# Patient Record
Sex: FEMALE | Race: BLACK OR AFRICAN AMERICAN | Employment: FULL TIME | URBAN - METROPOLITAN AREA
[De-identification: names, ages, dates, MRNs, and addresses within clinical notes are randomized per-mention and may not be internally consistent; named-entity substitution may affect disease eponyms.]

---

## 2018-12-31 ENCOUNTER — HOSPITAL ENCOUNTER (EMERGENCY)
Age: 23
Discharge: HOME | End: 2018-12-31
Attending: EMERGENCY MEDICINE

## 2018-12-31 VITALS
TEMPERATURE: 98 F | WEIGHT: 201.31 LBS | OXYGEN SATURATION: 99 % | RESPIRATION RATE: 16 BRPM | HEART RATE: 80 BPM | DIASTOLIC BLOOD PRESSURE: 76 MMHG | SYSTOLIC BLOOD PRESSURE: 125 MMHG | BODY MASS INDEX: 34.56 KG/M2

## 2018-12-31 DIAGNOSIS — O23.41 URINARY TRACT INFECTION IN MOTHER DURING FIRST TRIMESTER OF PREGNANCY: Primary | ICD-10-CM

## 2018-12-31 LAB
A/G RATIO: 0.9 (ref 1.1–2.2)
ALBUMIN SERPL-MCNC: 3.8 G/DL (ref 3.4–5)
ALP BLD-CCNC: 58 U/L (ref 40–129)
ALT SERPL-CCNC: 22 U/L (ref 10–40)
ANION GAP SERPL CALCULATED.3IONS-SCNC: 9 MMOL/L (ref 3–16)
AST SERPL-CCNC: 14 U/L (ref 15–37)
BACTERIA: ABNORMAL /HPF
BASOPHILS ABSOLUTE: 0 K/UL (ref 0–0.2)
BASOPHILS RELATIVE PERCENT: 0.3 %
BILIRUB SERPL-MCNC: 0.4 MG/DL (ref 0–1)
BILIRUBIN URINE: NEGATIVE
BLOOD, URINE: NEGATIVE
BUN BLDV-MCNC: 5 MG/DL (ref 7–20)
CALCIUM SERPL-MCNC: 9.1 MG/DL (ref 8.3–10.6)
CHLORIDE BLD-SCNC: 104 MMOL/L (ref 99–110)
CLARITY: ABNORMAL
CO2: 23 MMOL/L (ref 21–32)
COLOR: YELLOW
CREAT SERPL-MCNC: <0.5 MG/DL (ref 0.6–1.1)
EOSINOPHILS ABSOLUTE: 0.1 K/UL (ref 0–0.6)
EOSINOPHILS RELATIVE PERCENT: 1.7 %
EPITHELIAL CELLS, UA: 13 /HPF (ref 0–5)
GFR AFRICAN AMERICAN: >60
GFR NON-AFRICAN AMERICAN: >60
GLOBULIN: 4.1 G/DL
GLUCOSE BLD-MCNC: 88 MG/DL (ref 70–99)
GLUCOSE URINE: NEGATIVE MG/DL
GONADOTROPIN, CHORIONIC (HCG) QUANT: NORMAL MIU/ML
HCT VFR BLD CALC: 37.8 % (ref 36–48)
HEMOGLOBIN: 12.8 G/DL (ref 12–16)
HYALINE CASTS: 9 /LPF (ref 0–8)
KETONES, URINE: NEGATIVE MG/DL
LEUKOCYTE ESTERASE, URINE: ABNORMAL
LYMPHOCYTES ABSOLUTE: 2.4 K/UL (ref 1–5.1)
LYMPHOCYTES RELATIVE PERCENT: 37.6 %
MCH RBC QN AUTO: 29.4 PG (ref 26–34)
MCHC RBC AUTO-ENTMCNC: 33.8 G/DL (ref 31–36)
MCV RBC AUTO: 86.8 FL (ref 80–100)
MICROSCOPIC EXAMINATION: YES
MONOCYTES ABSOLUTE: 0.5 K/UL (ref 0–1.3)
MONOCYTES RELATIVE PERCENT: 8.2 %
NEUTROPHILS ABSOLUTE: 3.3 K/UL (ref 1.7–7.7)
NEUTROPHILS RELATIVE PERCENT: 52.2 %
NITRITE, URINE: NEGATIVE
PDW BLD-RTO: 13.3 % (ref 12.4–15.4)
PH UA: 6
PLATELET # BLD: 345 K/UL (ref 135–450)
PMV BLD AUTO: 7.8 FL (ref 5–10.5)
POTASSIUM SERPL-SCNC: 4.2 MMOL/L (ref 3.5–5.1)
PROTEIN UA: NEGATIVE MG/DL
RBC # BLD: 4.35 M/UL (ref 4–5.2)
RBC UA: 2 /HPF (ref 0–4)
SODIUM BLD-SCNC: 136 MMOL/L (ref 136–145)
SPECIFIC GRAVITY UA: 1.02
TOTAL PROTEIN: 7.9 G/DL (ref 6.4–8.2)
URINE TYPE: ABNORMAL
UROBILINOGEN, URINE: 1 E.U./DL
WBC # BLD: 6.3 K/UL (ref 4–11)
WBC UA: 11 /HPF (ref 0–5)

## 2018-12-31 PROCEDURE — 81001 URINALYSIS AUTO W/SCOPE: CPT

## 2018-12-31 PROCEDURE — 85025 COMPLETE CBC W/AUTO DIFF WBC: CPT

## 2018-12-31 PROCEDURE — 36415 COLL VENOUS BLD VENIPUNCTURE: CPT

## 2018-12-31 PROCEDURE — 80053 COMPREHEN METABOLIC PANEL: CPT

## 2018-12-31 PROCEDURE — 99284 EMERGENCY DEPT VISIT MOD MDM: CPT

## 2018-12-31 PROCEDURE — 84702 CHORIONIC GONADOTROPIN TEST: CPT

## 2018-12-31 RX ORDER — NITROFURANTOIN 25; 75 MG/1; MG/1
100 CAPSULE ORAL 2 TIMES DAILY
Qty: 14 CAPSULE | Refills: 0 | Status: SHIPPED | OUTPATIENT
Start: 2018-12-31 | End: 2019-01-07

## 2018-12-31 ASSESSMENT — PAIN DESCRIPTION - LOCATION: LOCATION: ABDOMEN

## 2018-12-31 ASSESSMENT — PAIN DESCRIPTION - PAIN TYPE: TYPE: ACUTE PAIN

## 2018-12-31 ASSESSMENT — PAIN DESCRIPTION - DESCRIPTORS: DESCRIPTORS: CRAMPING

## 2018-12-31 ASSESSMENT — PAIN SCALES - GENERAL: PAINLEVEL_OUTOF10: 4

## 2019-02-26 ENCOUNTER — HOSPITAL ENCOUNTER (EMERGENCY)
Age: 24
Discharge: HOME | End: 2019-02-26
Payer: COMMERCIAL

## 2019-02-26 VITALS
DIASTOLIC BLOOD PRESSURE: 71 MMHG | OXYGEN SATURATION: 100 % | RESPIRATION RATE: 16 BRPM | WEIGHT: 208 LBS | SYSTOLIC BLOOD PRESSURE: 128 MMHG | TEMPERATURE: 98.1 F | HEIGHT: 64 IN | HEART RATE: 75 BPM | BODY MASS INDEX: 35.51 KG/M2

## 2019-02-26 DIAGNOSIS — O99.891 ASYMPTOMATIC BACTERIURIA DURING PREGNANCY: ICD-10-CM

## 2019-02-26 DIAGNOSIS — R42 POSITIONAL LIGHTHEADEDNESS: Primary | ICD-10-CM

## 2019-02-26 DIAGNOSIS — R82.71 ASYMPTOMATIC BACTERIURIA DURING PREGNANCY: ICD-10-CM

## 2019-02-26 LAB
A/G RATIO: 0.9 (ref 1.1–2.2)
ALBUMIN SERPL-MCNC: 3.7 G/DL (ref 3.4–5)
ALP BLD-CCNC: 84 U/L (ref 40–129)
ALT SERPL-CCNC: 22 U/L (ref 10–40)
ANION GAP SERPL CALCULATED.3IONS-SCNC: 12 MMOL/L (ref 3–16)
AST SERPL-CCNC: 15 U/L (ref 15–37)
BACTERIA: ABNORMAL /HPF
BASOPHILS ABSOLUTE: 0 K/UL (ref 0–0.2)
BASOPHILS RELATIVE PERCENT: 0.2 %
BILIRUB SERPL-MCNC: 0.4 MG/DL (ref 0–1)
BILIRUBIN URINE: NEGATIVE
BLOOD, URINE: NEGATIVE
BUN BLDV-MCNC: 5 MG/DL (ref 7–20)
CALCIUM SERPL-MCNC: 9.3 MG/DL (ref 8.3–10.6)
CHLORIDE BLD-SCNC: 98 MMOL/L (ref 99–110)
CLARITY: ABNORMAL
CO2: 23 MMOL/L (ref 21–32)
COLOR: YELLOW
CREAT SERPL-MCNC: <0.5 MG/DL (ref 0.6–1.1)
EOSINOPHILS ABSOLUTE: 0.2 K/UL (ref 0–0.6)
EOSINOPHILS RELATIVE PERCENT: 2.8 %
EPITHELIAL CELLS, UA: 20 /HPF (ref 0–5)
GFR AFRICAN AMERICAN: >60
GFR NON-AFRICAN AMERICAN: >60
GLOBULIN: 4.2 G/DL
GLUCOSE BLD-MCNC: 94 MG/DL (ref 70–99)
GLUCOSE URINE: NEGATIVE MG/DL
HCT VFR BLD CALC: 35.2 % (ref 36–48)
HEMOGLOBIN: 12 G/DL (ref 12–16)
KETONES, URINE: 15 MG/DL
LEUKOCYTE ESTERASE, URINE: NEGATIVE
LYMPHOCYTES ABSOLUTE: 1.8 K/UL (ref 1–5.1)
LYMPHOCYTES RELATIVE PERCENT: 25.8 %
MCH RBC QN AUTO: 30.2 PG (ref 26–34)
MCHC RBC AUTO-ENTMCNC: 34 G/DL (ref 31–36)
MCV RBC AUTO: 89 FL (ref 80–100)
MICROSCOPIC EXAMINATION: YES
MONOCYTES ABSOLUTE: 0.8 K/UL (ref 0–1.3)
MONOCYTES RELATIVE PERCENT: 10.7 %
NEUTROPHILS ABSOLUTE: 4.3 K/UL (ref 1.7–7.7)
NEUTROPHILS RELATIVE PERCENT: 60.5 %
NITRITE, URINE: NEGATIVE
PDW BLD-RTO: 13.8 % (ref 12.4–15.4)
PH UA: 6
PLATELET # BLD: 320 K/UL (ref 135–450)
PMV BLD AUTO: 8.3 FL (ref 5–10.5)
POTASSIUM SERPL-SCNC: 4.5 MMOL/L (ref 3.5–5.1)
PROTEIN UA: NEGATIVE MG/DL
RBC # BLD: 3.95 M/UL (ref 4–5.2)
RBC UA: 2 /HPF (ref 0–4)
SODIUM BLD-SCNC: 133 MMOL/L (ref 136–145)
SPECIFIC GRAVITY UA: 1.03
TOTAL PROTEIN: 7.9 G/DL (ref 6.4–8.2)
URINE REFLEX TO CULTURE: ABNORMAL
URINE TYPE: ABNORMAL
UROBILINOGEN, URINE: 1 E.U./DL
WBC # BLD: 7 K/UL (ref 4–11)
WBC UA: 4 /HPF (ref 0–5)

## 2019-02-26 PROCEDURE — 85025 COMPLETE CBC W/AUTO DIFF WBC: CPT

## 2019-02-26 PROCEDURE — 81001 URINALYSIS AUTO W/SCOPE: CPT

## 2019-02-26 PROCEDURE — 96360 HYDRATION IV INFUSION INIT: CPT

## 2019-02-26 PROCEDURE — 2580000003 HC RX 258: Performed by: PHYSICIAN ASSISTANT

## 2019-02-26 PROCEDURE — 80053 COMPREHEN METABOLIC PANEL: CPT

## 2019-02-26 PROCEDURE — 36415 COLL VENOUS BLD VENIPUNCTURE: CPT

## 2019-02-26 PROCEDURE — 99284 EMERGENCY DEPT VISIT MOD MDM: CPT

## 2019-02-26 RX ORDER — 0.9 % SODIUM CHLORIDE 0.9 %
1000 INTRAVENOUS SOLUTION INTRAVENOUS ONCE
Status: COMPLETED | OUTPATIENT
Start: 2019-02-26 | End: 2019-02-26

## 2019-02-26 RX ORDER — NITROFURANTOIN 25; 75 MG/1; MG/1
100 CAPSULE ORAL 2 TIMES DAILY
Qty: 14 CAPSULE | Refills: 0 | Status: SHIPPED | OUTPATIENT
Start: 2019-02-26 | End: 2019-03-05

## 2019-02-26 RX ADMIN — SODIUM CHLORIDE 1000 ML: 9 INJECTION, SOLUTION INTRAVENOUS at 19:17

## 2019-02-26 ASSESSMENT — ENCOUNTER SYMPTOMS
SHORTNESS OF BREATH: 0
NAUSEA: 0
BACK PAIN: 0
ABDOMINAL PAIN: 0
VOMITING: 0
DIARRHEA: 0
CONSTIPATION: 0
COLOR CHANGE: 0

## 2019-05-27 ENCOUNTER — HOSPITAL ENCOUNTER (OUTPATIENT)
Age: 24
Discharge: HOME | End: 2019-05-27
Attending: OBSTETRICS & GYNECOLOGY | Admitting: OBSTETRICS & GYNECOLOGY
Payer: COMMERCIAL

## 2019-05-27 ENCOUNTER — HOSPITAL ENCOUNTER (EMERGENCY)
Age: 24
Discharge: HOME | End: 2019-05-27
Attending: EMERGENCY MEDICINE
Payer: COMMERCIAL

## 2019-05-27 ENCOUNTER — APPOINTMENT (OUTPATIENT)
Dept: GENERAL RADIOLOGY | Age: 24
End: 2019-05-27
Payer: COMMERCIAL

## 2019-05-27 VITALS
SYSTOLIC BLOOD PRESSURE: 134 MMHG | TEMPERATURE: 97.5 F | RESPIRATION RATE: 18 BRPM | HEART RATE: 93 BPM | DIASTOLIC BLOOD PRESSURE: 83 MMHG

## 2019-05-27 VITALS
BODY MASS INDEX: 36.88 KG/M2 | HEIGHT: 64 IN | WEIGHT: 216 LBS | HEART RATE: 100 BPM | SYSTOLIC BLOOD PRESSURE: 124 MMHG | RESPIRATION RATE: 16 BRPM | OXYGEN SATURATION: 100 % | TEMPERATURE: 98.5 F | DIASTOLIC BLOOD PRESSURE: 88 MMHG

## 2019-05-27 DIAGNOSIS — Z3A.31 31 WEEKS GESTATION OF PREGNANCY: ICD-10-CM

## 2019-05-27 DIAGNOSIS — R55 NEAR SYNCOPE: Primary | ICD-10-CM

## 2019-05-27 PROBLEM — Z36.89 NON-STRESS TEST REACTIVE ON FETAL SURVEILLANCE: Status: ACTIVE | Noted: 2019-05-27

## 2019-05-27 LAB
A/G RATIO: 1 (ref 1.1–2.2)
ALBUMIN SERPL-MCNC: 3.4 G/DL (ref 3.4–5)
ALP BLD-CCNC: 75 U/L (ref 40–129)
ALT SERPL-CCNC: 14 U/L (ref 10–40)
ANION GAP SERPL CALCULATED.3IONS-SCNC: 11 MMOL/L (ref 3–16)
AST SERPL-CCNC: 16 U/L (ref 15–37)
BASOPHILS ABSOLUTE: 0 K/UL (ref 0–0.2)
BASOPHILS RELATIVE PERCENT: 0 %
BILIRUB SERPL-MCNC: 0.3 MG/DL (ref 0–1)
BILIRUBIN URINE: NEGATIVE
BLOOD, URINE: NEGATIVE
BUN BLDV-MCNC: 7 MG/DL (ref 7–20)
CALCIUM SERPL-MCNC: 9.5 MG/DL (ref 8.3–10.6)
CHLORIDE BLD-SCNC: 103 MMOL/L (ref 99–110)
CLARITY: CLEAR
CO2: 22 MMOL/L (ref 21–32)
COLOR: YELLOW
CREAT SERPL-MCNC: <0.5 MG/DL (ref 0.6–1.1)
EOSINOPHILS ABSOLUTE: 0.1 K/UL (ref 0–0.6)
EOSINOPHILS RELATIVE PERCENT: 1 %
GFR AFRICAN AMERICAN: >60
GFR NON-AFRICAN AMERICAN: >60
GLOBULIN: 3.5 G/DL
GLUCOSE BLD-MCNC: 91 MG/DL (ref 70–99)
GLUCOSE URINE: NEGATIVE MG/DL
HCT VFR BLD CALC: 30.7 % (ref 36–48)
HEMATOLOGY PATH CONSULT: YES
HEMOGLOBIN: 10.5 G/DL (ref 12–16)
KETONES, URINE: NEGATIVE MG/DL
LEUKOCYTE ESTERASE, URINE: NEGATIVE
LYMPHOCYTES ABSOLUTE: 2.5 K/UL (ref 1–5.1)
LYMPHOCYTES RELATIVE PERCENT: 30 %
MCH RBC QN AUTO: 30.5 PG (ref 26–34)
MCHC RBC AUTO-ENTMCNC: 34.3 G/DL (ref 31–36)
MCV RBC AUTO: 89 FL (ref 80–100)
MICROSCOPIC EXAMINATION: NORMAL
MONOCYTES ABSOLUTE: 0.7 K/UL (ref 0–1.3)
MONOCYTES RELATIVE PERCENT: 9 %
MONONUCLEAR UNIDENTIFIED CELLS: 1 %
NEUTROPHILS ABSOLUTE: 4.9 K/UL (ref 1.7–7.7)
NEUTROPHILS RELATIVE PERCENT: 59 %
NITRITE, URINE: NEGATIVE
PDW BLD-RTO: 13.3 % (ref 12.4–15.4)
PH UA: 6 (ref 5–8)
PLATELET # BLD: 264 K/UL (ref 135–450)
PLATELET SLIDE REVIEW: ADEQUATE
PMV BLD AUTO: 7.7 FL (ref 5–10.5)
POTASSIUM SERPL-SCNC: 3.9 MMOL/L (ref 3.5–5.1)
PROTEIN UA: NEGATIVE MG/DL
RBC # BLD: 3.45 M/UL (ref 4–5.2)
RBC # BLD: NORMAL 10*6/UL
SLIDE REVIEW: ABNORMAL
SODIUM BLD-SCNC: 136 MMOL/L (ref 136–145)
SPECIFIC GRAVITY UA: 1.02 (ref 1–1.03)
TOTAL PROTEIN: 6.9 G/DL (ref 6.4–8.2)
TROPONIN: <0.01 NG/ML
URINE REFLEX TO CULTURE: NORMAL
URINE TYPE: NORMAL
UROBILINOGEN, URINE: 0.2 E.U./DL
WBC # BLD: 8.3 K/UL (ref 4–11)

## 2019-05-27 PROCEDURE — 99284 EMERGENCY DEPT VISIT MOD MDM: CPT

## 2019-05-27 PROCEDURE — 84484 ASSAY OF TROPONIN QUANT: CPT

## 2019-05-27 PROCEDURE — 80053 COMPREHEN METABOLIC PANEL: CPT

## 2019-05-27 PROCEDURE — 81003 URINALYSIS AUTO W/O SCOPE: CPT

## 2019-05-27 PROCEDURE — 85025 COMPLETE CBC W/AUTO DIFF WBC: CPT

## 2019-05-27 PROCEDURE — 36415 COLL VENOUS BLD VENIPUNCTURE: CPT

## 2019-05-27 PROCEDURE — 2580000003 HC RX 258: Performed by: PHYSICIAN ASSISTANT

## 2019-05-27 PROCEDURE — 93005 ELECTROCARDIOGRAM TRACING: CPT

## 2019-05-27 PROCEDURE — 99211 OFF/OP EST MAY X REQ PHY/QHP: CPT

## 2019-05-27 RX ORDER — 0.9 % SODIUM CHLORIDE 0.9 %
1000 INTRAVENOUS SOLUTION INTRAVENOUS ONCE
Status: COMPLETED | OUTPATIENT
Start: 2019-05-27 | End: 2019-05-27

## 2019-05-27 RX ORDER — VALACYCLOVIR HYDROCHLORIDE 500 MG/1
500 TABLET, FILM COATED ORAL 2 TIMES DAILY
COMMUNITY
End: 2020-12-17

## 2019-05-27 RX ADMIN — SODIUM CHLORIDE 1000 ML: 9 INJECTION, SOLUTION INTRAVENOUS at 19:21

## 2019-05-27 ASSESSMENT — ENCOUNTER SYMPTOMS
DIARRHEA: 0
VOMITING: 0
COUGH: 0
WHEEZING: 0
SHORTNESS OF BREATH: 1
RHINORRHEA: 0
ABDOMINAL PAIN: 0
NAUSEA: 0

## 2019-05-27 ASSESSMENT — PAIN DESCRIPTION - LOCATION: LOCATION: HEAD

## 2019-05-27 NOTE — ED NOTES
Bed: 11  Expected date:   Expected time:   Means of arrival: [facility]  Comments:  [address]       Joyce Adame RN  05/27/19 7728

## 2019-05-27 NOTE — ED PROVIDER NOTES
[facility]        Pt Name: Christoph Plata  MRN: [id number]  [place] 1995  Date of evaluation: 2019  Provider: Misa Felder PA-C  PCP: Judy Heard MD    This patient was seen and evaluated by the attending physician Dr. Genoveva Mansfield. CHIEF COMPLAINT       Chief Complaint   Patient presents with    Loss of Consciousness     Pt states she was working in a hot area where she has \"passed out before\" and passed out today. Denies any vaginal bleeding. Now reports c/o headache. Pt is 31wks pregnant.  4 para 0. HISTORY OF PRESENT ILLNESS   (Location/Symptom, Timing/Onset, Context/Setting, Quality, Duration, Modifying Factors, Severity)  Note limiting factors. Christoph Plata is a 21 y.o. female , currently approximately 31 weeks pregnant, G4, P0, Ab3 presents for evaluation of syncope. 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 She states that she felt very dizzy and went to sit down in the next thing she knows her bosses over her and trying to wake her up. She states that she was unresponsive for approximately 2-3 minutes. She states that she has passed out at work before and was told that she is dehydrated. She states that she has tried to drink a lot today. She denies any chest pain or shortness of breath at this time. She feels dizzy/lightheaded. No weakness, visual disturbances. No abdominal pain, nausea or vomiting. No vaginal bleeding or discharge. No urinary complaints. She denies falling and hitting her head. No other complaints or concerns at this time. Nursing Notes were all reviewed and agreed with or any disagreements were addressed  in the HPI.     REVIEW OF SYSTEMS    (2-9 systems for level 4, 10 or more for level 5)     Review of Systems   Constitutional: Negative for Neurological: She is alert and oriented to person, place, and time. She is not disoriented. No cranial nerve deficit. GCS eye subscore is 4. GCS verbal subscore is 5. GCS motor subscore is 6. Skin: Skin is warm and dry. She is not diaphoretic. Psychiatric: She has a normal mood and affect. Her behavior is normal.   Nursing note and vitals reviewed. DIAGNOSTIC RESULTS   LABS:    Labs Reviewed   CBC WITH AUTO DIFFERENTIAL - Abnormal; Notable for the following components:       Result Value    RBC 3.45 (*)     Hemoglobin 10.5 (*)     Hematocrit 30.7 (*)     Unid. Mononu 1 (*)     All other components within normal limits    Narrative:     Performed at:  [facility] [address], [place]   Phone [phone]   COMPREHENSIVE METABOLIC PANEL - Abnormal; Notable for the following components:    CREATININE <0.5 (*)     Albumin/Globulin Ratio 1.0 (*)     All other components within normal limits    Narrative:     Performed at:  [facility] [address], [place] [facility] [place]   Phone [phone]   TROPONIN    Narrative:     Performed at:  [facility] [address], [place] [facility] [place]   Phone [phone]   URINE RT REFLEX TO CULTURE       All other labs were within normal range or not returned as of this dictation. EKG: All EKG's are interpreted by the Emergency Department Physician who either signs orCo-signs this chart in the absence of a cardiologist.  Please see their note for interpretation of EKG. RADIOLOGY:   Non-plain film images such as CT, Ultrasound and MRI are read by the radiologist. Plain radiographic images are visualized andpreliminarily interpreted by the  ED Provider with the below findings:        Interpretation perthe Radiologist below, if available at the time of this note:    No orders to display     No results found.        PROCEDURES   Unless otherwise noted below, none     Procedures    CRITICAL CARE TIME   N/A    CONSULTS:  None      EMERGENCY DEPARTMENT COURSE and DIFFERENTIALDIAGNOSIS/MDM:   Vitals:    Vitals:    05/27/19 1821 05/27/19 1925 05/27/19 2125   BP: 122/83 128/82 124/88   Pulse: 103 104 100   Resp: 18 16 16   Temp: 98.5 °F (36.9 °C)     SpO2: 99% 99% 100%   Weight: 216 lb (98 kg)     Height: 5' 4\" (1.626 m)         Patient was given thefollowing medications:  Medications   0.9 % sodium chloride bolus (0 mLs Intravenous Stopped 5/27/19 2015)       Patient presents for evaluation after syncopal episode. On exam, she is well-appearing and in no acute distress. On exam, she is slightly tachycardic but vitals otherwise stable and she is afebrile. She is alert and oriented ×3. GCS 15. Cranial nerves II through XII are intact. Lungs are clear to auscultation bilaterally, chest is nontender and abdomen is benign. Patient was given fluid resuscitation for symptomatic relief and will be reevaluated. Fetal heart tones were detected by nursing staff. Please see attending note for EKG interpretation. CBC, CMP are unremarkable aside from mild chronic anemia with hemoglobin of 10.5. Troponin is negative. The patient was tolerating p.o., liter of fluids in the ED and remained asymptomatic. I spoke with the on-call Laurita Neves, Dr. Yesenia Loo, who is recommending patient be sent upstairs for nonstress test. She was encouraged to then follow up either PCP or ObGyn as scheduled or as needed. Patient has a normal repeat neurological exam. At this time there is no indication of head injury, encephalopathy, multiple sclerosis, TIA/CVA, seizures, dehydration, hypoglycemia, mass lesions, metabolic cause, cardiac arrhythmia, PE, GI bleeding or orthostatic hypotension. Patient is stable throughout ED course and safe for outpatient follow-up. Patient made aware of treatment plan and verbally understood and agreed.  Patient stable for outpatient follow-up with their family doctor in 24-48 hours. FINAL IMPRESSION      1.  Near syncope          DISPOSITION/PLAN   DISPOSITION        PATIENT REFERREDTO:  Zuly Robles MD  [address] [place] [address]  [phone]    Call   For a re-check in 2-3   days    [facility] Emergency Department  [address] [facility]  [phone]  Go to   If symptoms worsen      DISCHARGE MEDICATIONS:  New Prescriptions    No medications on file       DISCONTINUED MEDICATIONS:  Discontinued Medications    No medications on file              (Please note that portions ofthis note were completed with a voice recognition program.  Efforts were made to edit the dictations but occasionally words are mis-transcribed.)    Ashia Reece PA-C (electronically signed)           Jaja Causey PA-C  05/27/19 0384

## 2019-05-27 NOTE — ED NOTES
Saline lock inserted with blood drawn and sent to lab. Pt resting at present. Denies any pain, dizziness, or headaches. Stated that is was just hot at her job and she passed out. Pt 31 weeks pregnant. No abdominal cramping noted. Respirations even and unlabored. NS infusing IV. Call bell in reach.      Erendira Jeter RN  05/27/19 1925

## 2019-05-28 NOTE — ED NOTES
Pt's NS was not infusing -100 ml infused. Saline lock catheter was kinked inside the vein. Pt did not want stuck again and stated that she was feeling better. [facility] stated to let pt drink 'big gulp' - pt drinking the water and tolerating po.         Nisreen Ryan RN  05/27/19 2035

## 2019-05-28 NOTE — ED PROVIDER NOTES
[facility] Emergency Department      Pt Name: Isabel Pittman  MRN: [id number]  [place] 1995  Date of evaluation: 2019  Provider: Artis Voss MD  I independently performed a history and physical on Isabel Pittman. All diagnostic, treatment, and disposition decisions were made by myself in conjunction with the advanced practice provider. HPI: Isabel Pittman presented with   Chief Complaint   Patient presents with    Loss of Consciousness     Pt states she was working in a hot area where she has \"passed out before\" and passed out today. Denies any vaginal bleeding. Now reports c/o headache. Pt is 31wks pregnant.  4 para 0. Isabel Pittman has a past medical history of Asthma and Herpes. She has a past surgical history that includes knee surgery (Right). No current facility-administered medications on file prior to encounter. Current Outpatient Medications on File Prior to Encounter   Medication Sig Dispense Refill    valACYclovir (VALTREX) 500 MG tablet Take 500 mg by mouth 2 times daily      Prenatal Vit-DSS-Fe Cbn-FA (PRENATAL AD) TABS Take 1 tablet by mouth daily 30 tablet 0    Prenatal Vit-Fe Fumarate-FA (PRENATABS FA) TABS Take 1 tablet by mouth daily 30 tablet 0    medroxyPROGESTERone (DEPO-PROVERA) 150 MG/ML injection Inject 150 mg into the muscle once.  albuterol (PROVENTIL HFA;VENTOLIN HFA) 108 (90 BASE) MCG/ACT inhaler Inhale 2 puffs into the lungs every 6 hours as needed.          PHYSICAL EXAM  Vitals: /83   Pulse 103   Temp 98.5 °F (36.9 °C)   Resp 18   Ht 5' 4\" (1.626 m)   Wt 216 lb (98 kg)   LMP 2018   SpO2 99%   BMI 37.08 kg/m²   Constitutional:  21 y.o. female alert  HENT:  Atraumatic, oral mucosa moist  Neck:  No visible JVD, supple  Chest/Lungs:  Respiratory effort normal, clear, regular  Abdomen:  Non-distended, soft, gravid, NT, FHT 140s  Back:  No gross deformity  Extremities:  Normal tone and perfusion, no edema or tenderness  Neurologic:  Alert, speech normal, mentation normal, pupils equal, normal coordination of extremities, no facial asymmetry, gait is normal    Medical Decision Making and Plan: Briefly, this is an 21 y. o.female who presented with near syncopal episode while at work, hot work environment. She is 31 weeks pregnant, reports good fetal movement. She says that she felt lightheaded and was able to sit down before she actually passed out and there was no injury whatsoever. She believes that she overheated. She is taking oral hydration in the ED. Clinical exam is reassuring. We doubt that the cause was a primary cardiac event such as an arrhythmia or obstructive process. We also doubt an acute neurologic event such as ischemic stroke, hemorrhage or seizure. She feels better now and we feel it is safe to discharge. Dr. Maicol Frost consulted and she would like to have the patient evaluated in L&D so she was referred upstairs prior to release. LorenaChurdan Mert was given appropriate discharge instructions. Referral to follow up provider. For further details of [address] [place] [address] Emergency Department encounter, please see documentation by advanced practice provider SEVERO Price. Labs Reviewed   CBC WITH AUTO DIFFERENTIAL - Abnormal; Notable for the following components:       Result Value    RBC 3.45 (*)     Hemoglobin 10.5 (*)     Hematocrit 30.7 (*)     Unid. Mononu 1 (*)     All other components within normal limits    Narrative:     Performed at:  [facility] [address], [place], [facility] [address]   Phone [phone]   COMPREHENSIVE METABOLIC PANEL - Abnormal; Notable for the following components:    CREATININE <0.5 (*)     Albumin/Globulin Ratio 1.0 (*)     All other components within normal limits    Narrative:     Performed at:  [facility]  [address] [facility], [address]   Phone [phone]   URINE RT REFLEX TO CULTURE

## 2019-05-28 NOTE — PROCEDURES
FETAL SURVEILLANCE TESTING SUMMARY    INDICATIONS:  patient reassurance- s/p near syncopal episode    OBJECTIVE RESULTS:  Fetal heart variability: moderate    Fetal surveillance: reassuring

## 2019-05-28 NOTE — ED NOTES
Pt drank 'big gulp' without difficulty and 'feeling better'. Pt did not want chest xray done. Family at bedside. OB consulted.      Zach Berg RN  05/27/19 0323

## 2019-05-28 NOTE — ED NOTES
Spoke with RN at [facility] triage - pt to have baby monitored. Will DC from ED. Pt ambulated to the restroom - no dizziness noted.      Lauri Trujillo RN  05/27/19 9934

## 2019-06-08 LAB
EKG ATRIAL RATE: 105 BPM
EKG DIAGNOSIS: NORMAL
EKG P AXIS: 48 DEGREES
EKG P-R INTERVAL: 136 MS
EKG Q-T INTERVAL: 336 MS
EKG QRS DURATION: 62 MS
EKG QTC CALCULATION (BAZETT): 444 MS
EKG R AXIS: 13 DEGREES
EKG T AXIS: 15 DEGREES
EKG VENTRICULAR RATE: 105 BPM

## 2020-12-02 LAB
ABO/RH: NORMAL
ANION GAP SERPL CALCULATED.3IONS-SCNC: 11 MMOL/L (ref 3–16)
BASOPHILS ABSOLUTE: 0.1 K/UL (ref 0–0.2)
BASOPHILS RELATIVE PERCENT: 0.6 %
BILIRUBIN URINE: NEGATIVE
BLOOD, URINE: NEGATIVE
BUN BLDV-MCNC: 10 MG/DL (ref 7–20)
CALCIUM SERPL-MCNC: 9 MG/DL (ref 8.3–10.6)
CHLORIDE BLD-SCNC: 104 MMOL/L (ref 99–110)
CLARITY: ABNORMAL
CO2: 18 MMOL/L (ref 21–32)
COLOR: YELLOW
CREAT SERPL-MCNC: <0.5 MG/DL (ref 0.6–1.1)
EOSINOPHILS ABSOLUTE: 0.1 K/UL (ref 0–0.6)
EOSINOPHILS RELATIVE PERCENT: 0.9 %
EPITHELIAL CELLS, UA: 1 /HPF (ref 0–5)
GFR AFRICAN AMERICAN: >60
GFR NON-AFRICAN AMERICAN: >60
GLUCOSE BLD-MCNC: 85 MG/DL (ref 70–99)
GLUCOSE URINE: NEGATIVE MG/DL
GONADOTROPIN, CHORIONIC (HCG) QUANT: 105.8 MIU/ML
HCT VFR BLD CALC: 36.2 % (ref 36–48)
HEMOGLOBIN: 11.8 G/DL (ref 12–16)
HYALINE CASTS: 1 /LPF (ref 0–8)
KETONES, URINE: NEGATIVE MG/DL
LEUKOCYTE ESTERASE, URINE: NEGATIVE
LYMPHOCYTES ABSOLUTE: 3.5 K/UL (ref 1–5.1)
LYMPHOCYTES RELATIVE PERCENT: 38.5 %
MCH RBC QN AUTO: 27.9 PG (ref 26–34)
MCHC RBC AUTO-ENTMCNC: 32.6 G/DL (ref 31–36)
MCV RBC AUTO: 85.4 FL (ref 80–100)
MICROSCOPIC EXAMINATION: YES
MONOCYTES ABSOLUTE: 0.6 K/UL (ref 0–1.3)
MONOCYTES RELATIVE PERCENT: 6.6 %
NEUTROPHILS ABSOLUTE: 4.9 K/UL (ref 1.7–7.7)
NEUTROPHILS RELATIVE PERCENT: 53.4 %
NITRITE, URINE: NEGATIVE
PDW BLD-RTO: 14 % (ref 12.4–15.4)
PH UA: 5.5 (ref 5–8)
PLATELET # BLD: 398 K/UL (ref 135–450)
PMV BLD AUTO: 7.6 FL (ref 5–10.5)
POTASSIUM SERPL-SCNC: 3.6 MMOL/L (ref 3.5–5.1)
PROTEIN UA: NEGATIVE MG/DL
RBC # BLD: 4.24 M/UL (ref 4–5.2)
RBC UA: 0 /HPF (ref 0–4)
SODIUM BLD-SCNC: 133 MMOL/L (ref 136–145)
SPECIFIC GRAVITY UA: 1.01 (ref 1–1.03)
URINE REFLEX TO CULTURE: ABNORMAL
URINE TYPE: ABNORMAL
UROBILINOGEN, URINE: 0.2 E.U./DL
WBC # BLD: 9.1 K/UL (ref 4–11)
WBC UA: 0 /HPF (ref 0–5)

## 2020-12-02 PROCEDURE — 86900 BLOOD TYPING SEROLOGIC ABO: CPT

## 2020-12-02 PROCEDURE — 85025 COMPLETE CBC W/AUTO DIFF WBC: CPT

## 2020-12-02 PROCEDURE — 84702 CHORIONIC GONADOTROPIN TEST: CPT

## 2020-12-02 PROCEDURE — 99283 EMERGENCY DEPT VISIT LOW MDM: CPT

## 2020-12-02 PROCEDURE — 81001 URINALYSIS AUTO W/SCOPE: CPT

## 2020-12-02 PROCEDURE — 80048 BASIC METABOLIC PNL TOTAL CA: CPT

## 2020-12-02 PROCEDURE — 86901 BLOOD TYPING SEROLOGIC RH(D): CPT

## 2020-12-02 ASSESSMENT — PAIN SCALES - GENERAL: PAINLEVEL_OUTOF10: 8

## 2020-12-02 ASSESSMENT — PAIN DESCRIPTION - DESCRIPTORS: DESCRIPTORS: CRAMPING

## 2020-12-02 ASSESSMENT — PAIN DESCRIPTION - LOCATION: LOCATION: ABDOMEN

## 2020-12-02 ASSESSMENT — PAIN DESCRIPTION - PAIN TYPE: TYPE: ACUTE PAIN

## 2020-12-03 ENCOUNTER — HOSPITAL ENCOUNTER (EMERGENCY)
Age: 25
Discharge: HOME | End: 2020-12-03
Attending: EMERGENCY MEDICINE
Payer: COMMERCIAL

## 2020-12-03 ENCOUNTER — APPOINTMENT (OUTPATIENT)
Dept: ULTRASOUND IMAGING | Age: 25
End: 2020-12-03
Payer: COMMERCIAL

## 2020-12-03 VITALS
BODY MASS INDEX: 36.88 KG/M2 | TEMPERATURE: 98 F | OXYGEN SATURATION: 99 % | WEIGHT: 216 LBS | DIASTOLIC BLOOD PRESSURE: 93 MMHG | HEART RATE: 90 BPM | RESPIRATION RATE: 18 BRPM | HEIGHT: 64 IN | SYSTOLIC BLOOD PRESSURE: 137 MMHG

## 2020-12-03 PROCEDURE — 76817 TRANSVAGINAL US OBSTETRIC: CPT

## 2020-12-03 ASSESSMENT — ENCOUNTER SYMPTOMS
DIARRHEA: 0
NAUSEA: 0
ABDOMINAL PAIN: 1
VOMITING: 0
SHORTNESS OF BREATH: 0

## 2020-12-03 NOTE — LETTER
[facility] Emergency Department  [address] [place] [address], [address]             December 3, 2020    Patient: Radha Figueroa   YOB: 1995   Date of Visit: 12/2/2020       To Whom It May Concern:    Chula Pace was seen and treated in our emergency department on 12/2/2020. She may return to work on Monday 12/7/2020.       Sincerely,         [facility] Emergency Dept

## 2020-12-03 NOTE — ED PROVIDER NOTES
intrauterine pregnancy. 3. Trace pelvic free fluid surrounding right adnexa. Findings were discussed with physician assistant, Carolyn Pierre at 1:42 am   on 12/3/2020. CLINICAL IMPRESSION  1. Less than 8 weeks gestation of pregnancy    2. Abnormal ultrasound of uterus        Blood pressure (!) 138/95, pulse 81, temperature 98 °F (36.7 °C), temperature source Infrared, resp. rate 16, height 5' 4\" (1.626 m), weight 216 lb (98 kg), SpO2 98 %, unknown if currently breastfeeding. Ava Sawyer was discharged to home in stable condition. This chart was generated in part by using Dragon Dictation system and may contain errors related to that system including errors in grammar, punctuation, and spelling, as well as words and phrases that may be inappropriate. If there are any questions or concerns please feel free to contact the dictating provider for clarification.      Madiha Ibarra DO  ATTENDING, [address] [place] [address],   12/08/20 0715

## 2020-12-03 NOTE — ED NOTES
Pt presents with abdominal discomfort. Pt had U/S done- pt stated she was positive pregnant after doing a home test. Respirations even and unlabored. Call bell in reach.       Mitch Kirkpatrick RN  12/03/20 5816

## 2020-12-03 NOTE — ED PROVIDER NOTES
[facility]        Pt Name: Namrata Terrell  MRN: [id number]  [place] 1995  Date of evaluation: 12/2/2020  Provider: Phyllis Thakkar PA-C  PCP: Rhonda Guerrero MD     I have seen and evaluated this patient with my supervising physician [address] [place] [id number]       Chief Complaint   Patient presents with    Abdominal Pain     pt states she has been having some abd cramping x2 days. took preg test yesterday and was +. HISTORY OF PRESENT ILLNESS   (Location, Timing/Onset, Context/Setting, Quality, Duration, Modifying Factors, Severity, Associated Signs and Symptoms)  Note limiting factors. Namrata Terrell is a 22 y.o. female patient presents emergency department for evaluation of abdominal cramping for the past 2 days. Patient states she has had generalized fatigue and dry mouth. Patient states she has been staying well-hydrated. Patient states she has lower pelvic pain that is bilateral.  It is not worse 1 side or the other. Patient states she took a pregnancy test yesterday and it was positive. Patient's last menstrual period was on November 3. She denies any nausea or vomiting. Denies any fever. Denies feeling lightheaded or dizzy. Patient states she has had [id number]. She denies any bleeding or discharge. Nursing Notes were all reviewed and agreed with or any disagreements were addressed in the HPI. REVIEW OF SYSTEMS    (2-9 systems for level 4, 10 or more for level 5)     Review of Systems   Constitutional: Negative for fatigue and fever. HENT: Negative. Eyes: Negative for visual disturbance. Respiratory: Negative for shortness of breath. Cardiovascular: Negative for chest pain. Gastrointestinal: Positive for abdominal pain. Negative for diarrhea, nausea and vomiting. Genitourinary: Negative. Negative for dysuria, vaginal bleeding and vaginal discharge. Musculoskeletal: Negative.     Skin: Negative. Neurological: Negative. Positives and Pertinent negatives as per HPI. Except as noted above in the ROS, all other systems were reviewed and negative. PAST MEDICAL HISTORY     Past Medical History:   Diagnosis Date    Asthma     Herpes     genital type 2         SURGICAL HISTORY     Past Surgical History:   Procedure Laterality Date    KNEE SURGERY Right          CURRENTMEDICATIONS       Discharge Medication List as of 12/3/2020  2:49 AM      CONTINUE these medications which have NOT CHANGED    Details   valACYclovir (VALTREX) 500 MG tablet Take 500 mg by mouth 2 times dailyHistorical Med      Prenatal Vit-DSS-Fe Cbn-FA (PRENATAL AD) TABS Take 1 tablet by mouth daily, Disp-30 tablet, R-0Print      albuterol (PROVENTIL HFA;VENTOLIN HFA) 108 (90 BASE) MCG/ACT inhaler Inhale 2 puffs into the lungs every 6 hours as needed. Prenatal Vit-Fe Fumarate-FA (PRENATABS FA) TABS Take 1 tablet by mouth daily, Disp-30 tablet, R-0      medroxyPROGESTERone (DEPO-PROVERA) 150 MG/ML injection Inject 150 mg into the muscle once. ALLERGIES     Shellfish-derived products    FAMILYHISTORY       Family History   Problem Relation Age of Onset    High Cholesterol Mother     Diabetes Maternal Grandmother           SOCIAL HISTORY       Social History     Tobacco Use    Smoking status: Never Smoker    Smokeless tobacco: Never Used   Substance Use Topics    Alcohol use: No    Drug use: No       SCREENINGS             PHYSICAL EXAM    (up to 7 for level 4, 8 or more for level 5)     ED Triage Vitals [12/02/20 2121]   BP Temp Temp Source Pulse Resp SpO2 Height Weight   (!) 138/95 98 °F (36.7 °C) Infrared 81 16 98 % 5' 4\" (1.626 m) 216 lb (98 kg)       Physical Exam  Vitals signs and nursing note reviewed. Constitutional:       General: She is not in acute distress. Appearance: She is well-developed. She is not ill-appearing, toxic-appearing or diaphoretic.    HENT:      Head: Normocephalic and atraumatic. Nose: Nose normal.   Eyes:      General:         Right eye: No discharge. Left eye: No discharge. Neck:      Musculoskeletal: Normal range of motion and neck supple. Cardiovascular:      Rate and Rhythm: Normal rate and regular rhythm. Heart sounds: Normal heart sounds. No murmur. No gallop. Pulmonary:      Effort: Pulmonary effort is normal. No respiratory distress. Breath sounds: Normal breath sounds. No wheezing, rhonchi or rales. Abdominal:      General: Abdomen is flat. Bowel sounds are normal.      Palpations: Abdomen is soft. Tenderness: There is abdominal tenderness in the right lower quadrant and left lower quadrant. There is no guarding or rebound. Negative signs include Dumont's sign and McBurney's sign. Musculoskeletal: Normal range of motion. Skin:     General: Skin is warm and dry. Capillary Refill: Capillary refill takes less than 2 seconds. Coloration: Skin is not mottled or pale. Neurological:      General: No focal deficit present. Mental Status: She is alert and oriented to person, place, and time.    Psychiatric:         Mood and Affect: Mood normal.         Behavior: Behavior normal.         DIAGNOSTIC RESULTS   LABS:    Labs Reviewed   BASIC METABOLIC PANEL - Abnormal; Notable for the following components:       Result Value    Sodium 133 (*)     CO2 18 (*)     CREATININE <0.5 (*)     All other components within normal limits    Narrative:     Performed at:  [facility] [address], [place], [address] [facility]   Phone [phone]   CBC WITH AUTO DIFFERENTIAL - Abnormal; Notable for the following components:    Hemoglobin 11.8 (*)     All other components within normal limits    Narrative:     Performed at:  [facility] [address], [place], [facility]   Phone [phone]   URINE RT REFLEX TO CULTURE - Abnormal; Notable for the following components:    Clarity, UA CLOUDY (*)     All other components within normal limits    Narrative:     Performed at:  [facility] [address] [facility],  [place], [facility] [address]   Phone [phone]   HCG, QUANTITATIVE, PREGNANCY    Narrative:     Performed at:  [facility] [address] [facility],  [place], [address]   Phone [phone]   MICROSCOPIC URINALYSIS    Narrative:     Performed at:  [facility] [address] [place],  [place], [address]   Phone [phone]   ABO/RH    Narrative:     Performed at:  [facility] [address], [place],  [place], [facility] [address]   Phone [phone]       All other labs were within normal range or not returned as of this dictation. EKG: All EKG's are interpreted by the Emergency Department Physician in the absence of a cardiologist.  Please see their note for interpretation of EKG. RADIOLOGY:   Non-plain film images such as CT, Ultrasound and MRI are read by the radiologist. Plain radiographic images are visualized and preliminarily interpreted by the ED Provider with the below findings:        Interpretation per the Radiologist below, if available at the time of this note:    US OB TRANSVAGINAL   Final Result   1. Right ovarian tubal ring with echogenic properties and increased flow on   color Doppler evaluation concerning for presence of ectopic pregnancy. No   sonographic visualization of fetal pole or yolk sac. 2. No sonographic evidence of intrauterine pregnancy. 3. Trace pelvic free fluid surrounding right adnexa. Findings were discussed with physician assistant, Steve Ruvalcaba at 1:42 am   on 12/3/2020. Us Ob Transvaginal    Result Date: 12/3/2020  EXAMINATION: FIRST TRIMESTER OBSTETRIC ULTRASOUND 12/3/2020 TECHNIQUE: Transvaginal first trimester obstetric pelvic ultrasound was performed with color Doppler flow evaluation.  COMPARISON: None HISTORY: ORDERING SYSTEM PROVIDED HISTORY: Pregnant and Pelvic Pain TECHNOLOGIST PROVIDED HISTORY: Reason for exam:->Pregnant and Pelvic Pain FINDINGS: Uterus: 8.4 cm and is anteverted in position. Gestational Sac(s):  No sonographic visualization of gestational sac. Endometrial stripe measures 1.7 mm Yolk Sac:  Not visualized Fetal Pole:  Not visualized Crown Rump Length:  Not measured Fetal Heart Rate:  Not sonographically detected Right ovary: 3.0 x 2.6 x 2.4 cm. Right adnexal tubal ring is seen is noted within the right ovary which measures up to 1.2 cm in diameter and is hyperechoic relative to surrounding ovarian parenchyma with increased blood flow on color Doppler evaluation. Left ovary: 2.1 x 2.0 x 1.9 cm Free fluid: Trace pelvic free fluid is seen surrounding the right adnexa. 1. Right ovarian tubal ring with echogenic properties and increased flow on color Doppler evaluation concerning for presence of ectopic pregnancy. No sonographic visualization of fetal pole or yolk sac. 2. No sonographic evidence of intrauterine pregnancy. 3. Trace pelvic free fluid surrounding right adnexa. Findings were discussed with physician assistantMarshal at 1:42 am on 12/3/2020. PROCEDURES   Unless otherwise noted below, none     Procedures    CRITICAL CARE TIME   N/A    CONSULTS:  None      EMERGENCY DEPARTMENT COURSE and DIFFERENTIAL DIAGNOSIS/MDM:   Vitals:    Vitals:    12/02/20 2121 12/03/20 0248   BP: (!) 138/95 (!) 137/93   Pulse: 81 90   Resp: 16 18   Temp: 98 °F (36.7 °C)    TempSrc: Infrared    SpO2: 98% 99%   Weight: 216 lb (98 kg)    Height: 5' 4\" (1.626 m)        Patient was given the following medications:  Medications - No data to display      Patient presents emergency department for evaluation of abdominal pain in the setting of known pregnancy. Patient had a positive home pregnancy test yesterday. She states she just missed her period. Patient's beta-hCG is 105.8.   Patient states she is G7 AB 5 P1. She denies any bleeding. Abdomen is slightly tender to palpation to lower pelvis bilaterally. UA show no evidence of cystitis. Ultrasound shows right ovarian tubal ring with echogenic properties and increased flow concerning for ectopic pregnancy. There is no additional sonographic evidence of intrauterine pregnancy. Patient's OB/GYN practice was consulted at [address] [place] [address]. I spoke with Dr. Nathanael Garcia who states that the patient can be sent home and will be scheduled for follow-up blood work and examination ASA. He states his office will call the patient today. I did encourage the patient to call the office if she had not received a phone call by noon to schedule her follow-up evaluation. It was stressed to the patient that it was too early to determine exactly what will happen with this pregnancy. Patient has no history of any ectopic pregnancies with her multiple previous miscarriages. Patient is not in any significant discomfort. Patient is only very minimally tender on examination but the tenderness is equal bilaterally. There is no rebound or guarding. Patient appears well. Patient was given strict return precautions to return to the emergency department for any change in her symptoms. Patient is amenable to this close follow-up outpatient plan and will be discharged to speak with her OB/GYN's later today. At time of reevaluation patient does not have any significant abdominal pain and does not have any acute worsening of her symptoms. She still does not have any bleeding. I see nothing that would suggest an acute abdomen at this time. Based on history, physical exam, risk factors, and tests my suspicion for bowel obstruction, incarcerated hernia, acute pancreatitis, intra-abdominal abscess, perforated viscus, diverticulitis, cholecystitis, appendicitis, PID, ovarian torsion and tubo-ovarian abscess is very low.  There is no evidence of peritonitis, sepsis or toxicity at this time. I feel the patient can be managed as an outpatient with follow-up with her family doctor in 24-48 hours. Instructions have been given for the patient to return to the ED for worsening of the pain, high fevers, intractable vomiting, or bleeding. There was a high probability of life-threatening clinical deterioration in the patient's condition requiring my urgent intervention. The total critical care time spent while evaluating and treating this patient was at least 36 minutes. This excludes time spent doing separately billable procedures. This includes time at the bedside, data interpretation, medication management, obtaining critical history from collateral sources if the patient is unable to provide it directly, and physician consultation. Specifics of interventions taken and potentially life-threatening diagnostic considerations are listed in the medical decision making. FINAL IMPRESSION      1. Less than 8 weeks gestation of pregnancy    2.  Abnormal ultrasound of uterus          DISPOSITION/PLAN   DISPOSITION        PATIENT REFERREDTO:  Grace Rinne, MD  [facility]  [phone]    Schedule an appointment as soon as possible for a visit today  for re-evaluation      DISCHARGE MEDICATIONS:  Discharge Medication List as of 12/3/2020  2:49 AM          DISCONTINUED MEDICATIONS:  Discharge Medication List as of 12/3/2020  2:49 AM                 (Please note that portions of this note were completed with a voice recognition program.  Efforts were made to edit the dictations but occasionally words are mis-transcribed.)    Dominga Kawasaki, PA-C (electronically signed)            Dominga Kawasaki, PA-C  12/03/20 5789

## 2020-12-06 ENCOUNTER — HOSPITAL ENCOUNTER (EMERGENCY)
Age: 25
Discharge: HOME | End: 2020-12-06
Attending: STUDENT IN AN ORGANIZED HEALTH CARE EDUCATION/TRAINING PROGRAM
Payer: COMMERCIAL

## 2020-12-06 VITALS
HEART RATE: 100 BPM | DIASTOLIC BLOOD PRESSURE: 79 MMHG | WEIGHT: 216 LBS | TEMPERATURE: 98.3 F | BODY MASS INDEX: 35.99 KG/M2 | OXYGEN SATURATION: 97 % | SYSTOLIC BLOOD PRESSURE: 138 MMHG | HEIGHT: 65 IN | RESPIRATION RATE: 16 BRPM

## 2020-12-06 LAB
A/G RATIO: 1 (ref 1.1–2.2)
ALBUMIN SERPL-MCNC: 4 G/DL (ref 3.4–5)
ALP BLD-CCNC: 61 U/L (ref 40–129)
ALT SERPL-CCNC: 14 U/L (ref 10–40)
ANION GAP SERPL CALCULATED.3IONS-SCNC: 8 MMOL/L (ref 3–16)
AST SERPL-CCNC: 13 U/L (ref 15–37)
BASOPHILS ABSOLUTE: 0 K/UL (ref 0–0.2)
BASOPHILS RELATIVE PERCENT: 0.3 %
BILIRUB SERPL-MCNC: <0.2 MG/DL (ref 0–1)
BILIRUBIN URINE: NEGATIVE
BLOOD, URINE: NEGATIVE
BUN BLDV-MCNC: 7 MG/DL (ref 7–20)
CALCIUM SERPL-MCNC: 8.9 MG/DL (ref 8.3–10.6)
CHLORIDE BLD-SCNC: 103 MMOL/L (ref 99–110)
CLARITY: CLEAR
CO2: 25 MMOL/L (ref 21–32)
COLOR: YELLOW
CREAT SERPL-MCNC: <0.5 MG/DL (ref 0.6–1.1)
EOSINOPHILS ABSOLUTE: 0.1 K/UL (ref 0–0.6)
EOSINOPHILS RELATIVE PERCENT: 1.2 %
EPITHELIAL CELLS, UA: 4 /HPF (ref 0–5)
GFR AFRICAN AMERICAN: >60
GFR NON-AFRICAN AMERICAN: >60
GLOBULIN: 4.1 G/DL
GLUCOSE BLD-MCNC: 95 MG/DL (ref 70–99)
GLUCOSE URINE: NEGATIVE MG/DL
GONADOTROPIN, CHORIONIC (HCG) QUANT: 794.2 MIU/ML
HCT VFR BLD CALC: 34.3 % (ref 36–48)
HEMOGLOBIN: 11.4 G/DL (ref 12–16)
HYALINE CASTS: 0 /LPF (ref 0–8)
KETONES, URINE: NEGATIVE MG/DL
LEUKOCYTE ESTERASE, URINE: ABNORMAL
LYMPHOCYTES ABSOLUTE: 3 K/UL (ref 1–5.1)
LYMPHOCYTES RELATIVE PERCENT: 40.4 %
MCH RBC QN AUTO: 27.8 PG (ref 26–34)
MCHC RBC AUTO-ENTMCNC: 33.1 G/DL (ref 31–36)
MCV RBC AUTO: 83.9 FL (ref 80–100)
MICROSCOPIC EXAMINATION: YES
MONOCYTES ABSOLUTE: 0.5 K/UL (ref 0–1.3)
MONOCYTES RELATIVE PERCENT: 7 %
NEUTROPHILS ABSOLUTE: 3.8 K/UL (ref 1.7–7.7)
NEUTROPHILS RELATIVE PERCENT: 51.1 %
NITRITE, URINE: NEGATIVE
PDW BLD-RTO: 13.8 % (ref 12.4–15.4)
PH UA: 6 (ref 5–8)
PLATELET # BLD: 385 K/UL (ref 135–450)
PMV BLD AUTO: 7.6 FL (ref 5–10.5)
POTASSIUM SERPL-SCNC: 4.1 MMOL/L (ref 3.5–5.1)
PROTEIN UA: NEGATIVE MG/DL
RBC # BLD: 4.09 M/UL (ref 4–5.2)
RBC UA: 1 /HPF (ref 0–4)
SODIUM BLD-SCNC: 136 MMOL/L (ref 136–145)
SPECIFIC GRAVITY UA: 1.02 (ref 1–1.03)
TOTAL PROTEIN: 8.1 G/DL (ref 6.4–8.2)
URINE REFLEX TO CULTURE: ABNORMAL
URINE TYPE: ABNORMAL
UROBILINOGEN, URINE: 0.2 E.U./DL
WBC # BLD: 7.5 K/UL (ref 4–11)
WBC UA: 2 /HPF (ref 0–5)

## 2020-12-06 PROCEDURE — 80053 COMPREHEN METABOLIC PANEL: CPT

## 2020-12-06 PROCEDURE — 85025 COMPLETE CBC W/AUTO DIFF WBC: CPT

## 2020-12-06 PROCEDURE — 99283 EMERGENCY DEPT VISIT LOW MDM: CPT

## 2020-12-06 PROCEDURE — 81001 URINALYSIS AUTO W/SCOPE: CPT

## 2020-12-06 PROCEDURE — 84702 CHORIONIC GONADOTROPIN TEST: CPT

## 2020-12-06 PROCEDURE — 6370000000 HC RX 637 (ALT 250 FOR IP): Performed by: PHYSICIAN ASSISTANT

## 2020-12-06 RX ORDER — ACETAMINOPHEN 500 MG
500 TABLET ORAL ONCE
Status: COMPLETED | OUTPATIENT
Start: 2020-12-06 | End: 2020-12-06

## 2020-12-06 RX ADMIN — ACETAMINOPHEN 500 MG: 500 TABLET, FILM COATED ORAL at 18:36

## 2020-12-06 ASSESSMENT — PAIN DESCRIPTION - ORIENTATION: ORIENTATION: RIGHT

## 2020-12-06 ASSESSMENT — ENCOUNTER SYMPTOMS
NAUSEA: 0
DIARRHEA: 0
ABDOMINAL DISTENTION: 0
SHORTNESS OF BREATH: 0
ABDOMINAL PAIN: 1
WHEEZING: 0
VOMITING: 0
COLOR CHANGE: 0
STRIDOR: 0
BACK PAIN: 1
CONSTIPATION: 0
COUGH: 0

## 2020-12-06 ASSESSMENT — PAIN DESCRIPTION - PAIN TYPE
TYPE: ACUTE PAIN
TYPE: ACUTE PAIN

## 2020-12-06 ASSESSMENT — PAIN SCALES - GENERAL
PAINLEVEL_OUTOF10: 7

## 2020-12-06 ASSESSMENT — PAIN DESCRIPTION - LOCATION: LOCATION: BACK;HIP;ABDOMEN

## 2020-12-06 NOTE — ED PROVIDER NOTES
I independently performed a history and physical on Kasia Aguero. All diagnostic, treatment, and disposition decisions were made by myself in conjunction with the advanced practice provider. Briefly, this is a 22 y.o. female here for rule out ectopic pregnancy. Patient is endorsing sciatica pain in the lower back that radiates down her right leg. She is also endorsing some cramping to the right lower abdomen. Patient found out that she was pregnant on Wednesday. She did have an OB appointment afterward with her hCG levels increasing however an IUP was not located on ultrasound. She denies any vaginal bleeding, chest pain or trouble breathing. No loss of fluids. This is her sixth or seventh pregnancy she states. She does have a healthy child at home as well as a history of chemical abortions.     On exam pt is resting comfortably  Cardiac RRR, no murmur  Lungs clear bilaterally, no increased work of breathing  Abdomen soft nontender uterus nonpalpable  No calf edema or tenderness  Neuro no drift in extremities       Labs Reviewed   URINE RT REFLEX TO CULTURE - Abnormal; Notable for the following components:       Result Value    Leukocyte Esterase, Urine SMALL (*)     All other components within normal limits    Narrative:     Performed at:  [facility] [address], [place], [address] [place] [address]   Phone [phone]   CBC WITH AUTO DIFFERENTIAL - Abnormal; Notable for the following components:    Hemoglobin 11.4 (*)     Hematocrit 34.3 (*)     All other components within normal limits    Narrative:     Performed at:  [facility]  [address] [facility] [address]   Phone [phone]   COMPREHENSIVE METABOLIC PANEL - Abnormal; Notable for the following components:    CREATININE <0.5 (*)     Albumin/Globulin Ratio 1.0 (*)     AST 13 (*)     All other components within normal limits    Narrative:     Performed at:  [facility]) [facility]  [address] [facility], [place]   Phone [phone]   HCG, QUANTITATIVE, PREGNANCY    Narrative:     Performed at:  [facility]  [address],  [place], [place]   Phone [phone]   MICROSCOPIC URINALYSIS    Narrative:     Performed at:  [facility]  [address] [facility], [place]   Phone [phone]     Medications   acetaminophen (TYLENOL) tablet 500 mg (500 mg Oral Given 12/6/20 1836)     No orders to display       Course and MDM:  Patient is 42-year-old female with recent positive pregnancy test presenting to the emergency room for suspected ectopic. On arrival she is comfortable and hemodynamically stable. She has no focal abdominal tenderness or peritoneal signs. I have low suspicion for acute ovarian torsion or appendicitis at this time. Her hCGs are up trending from 4 days ago. Her current level is less than 800 and unlikely that we will be able to detect a gestational sac on ultrasound today. This case was discussed with Dr. Iglesia Chau with OB/GYN. They recommend repeating ultrasound as an outpatient in 1 week. Patient given these recommendations and is agreeable to plan. She understand that she must return to the emergency room if any worsening abdominal pain, bleeding or syncope or lightheadedness as these could be signs of a ruptured ectopic pregnancy. She is agreeable to this plan. Patient Referrals:  Goyo Harvey MD  [facility]    In 3 days      [facility] Emergency Department  [address] [place] [address]  [phone]    If symptoms worsen    follow up with your OB in 1-3 days for continued trending HCG levels and a repeat ultrasound in one week            Discharge Medications:  Discharge Medication List as of 12/6/2020  7:15 PM          FINAL IMPRESSION  1.  Abdominal pain during pregnancy in first trimester    2. Sciatica of right side        Blood pressure 138/79, pulse 100, temperature 98.3 °F (36.8 °C), temperature source Oral, resp. rate 16, height 5' 4.5\" (1.638 m), weight 216 lb (98 kg), last menstrual period 11/03/2020, SpO2 97 %, unknown if currently breastfeeding.      For further details of [address] [facility] [address] emergency department encounter, please see documentation by advanced practice provider       Candie Espana MD  12/06/20 3556

## 2020-12-06 NOTE — ED PROVIDER NOTES
5)     Review of Systems   Constitutional: Negative for chills and fever. HENT: Negative. Eyes: Negative for visual disturbance. Respiratory: Negative for cough, shortness of breath, wheezing and stridor. Cardiovascular: Negative for chest pain, palpitations and leg swelling. Gastrointestinal: Positive for abdominal pain. Negative for abdominal distention, constipation, diarrhea, nausea and vomiting. Endocrine: Negative. Genitourinary: Negative for difficulty urinating, dysuria, flank pain, frequency, hematuria, urgency, vaginal bleeding, vaginal discharge and vaginal pain. Musculoskeletal: Positive for back pain. Negative for neck pain and neck stiffness. Skin: Negative for color change, pallor, rash and wound. Neurological: Negative for dizziness, tremors, seizures, syncope, facial asymmetry, speech difficulty, weakness, light-headedness, numbness and headaches. Psychiatric/Behavioral: Negative for confusion. All other systems reviewed and are negative. Positives and Pertinent negatives as per HPI. Except as noted above in the ROS, all other systems were reviewed and negative. PAST MEDICAL HISTORY     Past Medical History:   Diagnosis Date    Asthma     Herpes     genital type 2         SURGICAL HISTORY     Past Surgical History:   Procedure Laterality Date    KNEE SURGERY Right          CURRENTMEDICATIONS       Previous Medications    ALBUTEROL (PROVENTIL HFA;VENTOLIN HFA) 108 (90 BASE) MCG/ACT INHALER    Inhale 2 puffs into the lungs every 6 hours as needed. MEDROXYPROGESTERONE (DEPO-PROVERA) 150 MG/ML INJECTION    Inject 150 mg into the muscle once.       PRENATAL VIT-DSS-FE CBN-FA (PRENATAL AD) TABS    Take 1 tablet by mouth daily    PRENATAL VIT-FE FUMARATE-FA (PRENATABS FA) TABS    Take 1 tablet by mouth daily    VALACYCLOVIR (VALTREX) 500 MG TABLET    Take 500 mg by mouth 2 times daily         ALLERGIES     Shellfish-derived products    FAMILYHISTORY Cervical back: Normal.      Thoracic back: Normal.      Lumbar back: She exhibits tenderness (right sided musculature). She exhibits normal range of motion, no bony tenderness, no swelling, no edema, no deformity and no laceration. Comments: Right SI joint tenderness with Positive SLR in RLE  No foot drop or saddle paresthesia  Able to heel and toe walk  No midline vertebral tenderness or step off deformity  No extremity edema, posterior calf or thigh tenderness, palpable cord, discoloration. Negative homans. Skin:     General: Skin is warm and dry. Capillary Refill: Capillary refill takes less than 2 seconds. Coloration: Skin is not jaundiced or pale. Findings: No bruising, erythema, lesion or rash. Neurological:      General: No focal deficit present. Mental Status: She is alert and oriented to person, place, and time. Sensory: No sensory deficit. Motor: No weakness.       Deep Tendon Reflexes: Reflexes normal.   Psychiatric:         Mood and Affect: Mood normal.         Behavior: Behavior normal.         DIAGNOSTIC RESULTS   LABS:    Labs Reviewed   URINE RT REFLEX TO CULTURE - Abnormal; Notable for the following components:       Result Value    Leukocyte Esterase, Urine SMALL (*)     All other components within normal limits    Narrative:     Performed at:  [facility] [address], [place], [address]   Phone [phone]   CBC WITH AUTO DIFFERENTIAL - Abnormal; Notable for the following components:    Hemoglobin 11.4 (*)     Hematocrit 34.3 (*)     All other components within normal limits    Narrative:     Performed at:  [facility] [address], [place], [facility] [address]   Phone [phone]   COMPREHENSIVE METABOLIC PANEL - Abnormal; Notable for the following components:    CREATININE <0.5 (*)     Albumin/Globulin Ratio 1.0 (*)     AST 13 (*)     All other components within normal limits Narrative:     Performed at:  [facility]  [address] [facility]  [place], [address]   Phone [phone]   HCG, QUANTITATIVE, PREGNANCY    Narrative:     Performed at:  [facility]  [address] [facility]  [place], [address]   Phone [phone]   MICROSCOPIC URINALYSIS    Narrative:     Performed at:  [facility]  [address] [facility]  [place], [address]   Phone [phone]       All other labs were within normal range or not returned as of this dictation. EKG: All EKG's are interpreted by the Emergency Department Physician in the absence of a cardiologist.  Please see their note for interpretation of EKG. RADIOLOGY:   Non-plain film images such as CT, Ultrasound and MRI are read by the radiologist. Plain radiographic images are visualized and preliminarily interpreted by the ED Provider with the below findings:        Interpretation per the Radiologist below, if available at the time of this note:    No orders to display     Us Ob Transvaginal    Result Date: 12/3/2020  EXAMINATION: FIRST TRIMESTER OBSTETRIC ULTRASOUND 12/3/2020 TECHNIQUE: Transvaginal first trimester obstetric pelvic ultrasound was performed with color Doppler flow evaluation. COMPARISON: None HISTORY: ORDERING SYSTEM PROVIDED HISTORY: Pregnant and Pelvic Pain TECHNOLOGIST PROVIDED HISTORY: Reason for exam:->Pregnant and Pelvic Pain FINDINGS: Uterus: 8.4 cm and is anteverted in position. Gestational Sac(s):  No sonographic visualization of gestational sac. Endometrial stripe measures 1.7 mm Yolk Sac:  Not visualized Fetal Pole:  Not visualized Crown Rump Length:  Not measured Fetal Heart Rate:  Not sonographically detected Right ovary: 3.0 x 2.6 x 2.4 cm.   Right adnexal tubal ring is seen is noted within the right ovary which measures up to 1.2 cm in diameter and is hyperechoic relative to surrounding ovarian parenchyma with increased blood flow on color Doppler evaluation. Left ovary: 2.1 x 2.0 x 1.9 cm Free fluid: Trace pelvic free fluid is seen surrounding the right adnexa. 1. Right ovarian tubal ring with echogenic properties and increased flow on color Doppler evaluation concerning for presence of ectopic pregnancy. No sonographic visualization of fetal pole or yolk sac. 2. No sonographic evidence of intrauterine pregnancy. 3. Trace pelvic free fluid surrounding right adnexa. Findings were discussed with physician assistant, Stanley Carrel at 1:42 am on 12/3/2020. PROCEDURES   Unless otherwise noted below, none     Procedures    CRITICAL CARE TIME   N/A    CONSULTS:  IP CONSULT TO OB GYN  **I spoke with Dr Alejo Tejeda, on call for Dr Carolina Herrera (her OB/GN) at [phone]. We discussed the patient's case in length and recent ultrasound and labwork/presentation. She says that this patient can follow up in their office. She can have continued hcg trends and does not recommend another ultrasound until one week has passed, as she is still early in pregnancy and not likely to see any significant change. She says that she has low suspicion for ectopic pregnancy because of the positive upward trending HCG levels. **    EMERGENCY DEPARTMENT COURSE and DIFFERENTIAL DIAGNOSIS/MDM:   Vitals:    Vitals:    12/06/20 1649 12/06/20 1730 12/06/20 1800 12/06/20 1834   BP: (!) 151/93 129/79 (!) 141/89    Pulse: 102   98   Resp: 16      Temp: 98.3 °F (36.8 °C)      TempSrc: Oral      SpO2: 100% 95% 97%    Weight: 216 lb (98 kg)      Height: 5' 4.5\" (1.638 m)          Patient was given the following medications:  Medications   acetaminophen (TYLENOL) tablet 500 mg (has no administration in time range)           This patient presents to the emergency department complaining of abdominal pain and pregnancy.   She has an upward trending hCG value and after discussion with the on-call OB/GYN, the specialist states that this is a positive upward trend and has low suspicion for ectopic pregnancy. She does not recommend another ultrasound but rather wants the patient to follow-up in their office this week. She recommends an ultrasound in 1 week. Patient understands and agrees with plan. Patient also reports acute on chronic sciatica symptoms. My suspicion is low for cauda equina, central cord syndrome, preeclampsia, help syndrome, acute fracture or dislocation, epidural abscess or hematoma, discitis, meningitis, encephalitis, transverse myelitis, shingles, intussusception,acute surgical abdomen, obstruction, perforation, abscess, mesenteric ischemia, AAA, dissection, cholecystitis, cholangitis, pancreatitis, appendicitis, C. diff colitis, diverticulitis, volvulus, incarcerated hernia, necrotizing fasciitis, TOA, ovarian torsion, PID, ectopic pregnancy, karissa amanda Joey syndrome,  incarcerated hernia, Mira gangrene, pyelonephritis, perinephric abscess, kidney stone, urosepsis, fistula or other concerning pathology. We have addressed her concerns and expectations. She was given strict return precautions. FINAL IMPRESSION      1. Abdominal pain during pregnancy in first trimester    2.  Sciatica of right side          DISPOSITION/PLAN   DISPOSITION Decision To Discharge 12/06/2020 06:34:44 PM      PATIENT REFERREDTO:  Saul Connell MD  [place] [address]  [phone]    In 3 days      [facility] Emergency Department  [address] [place] [address]  [phone]    If symptoms worsen    follow up with your OB in 1-3 days for continued trending HCG levels and a repeat ultrasound in one week            DISCHARGE MEDICATIONS:  New Prescriptions    No medications on file       DISCONTINUED MEDICATIONS:  Discontinued Medications    No medications on file              (Please note that portions of this note were completed with a voice recognition program.  Efforts were made to edit the dictations but occasionally words are mis-transcribed.)    Zulma Rubinstein, PA-C (electronically signed)           Zulma Rubinstein, PA-C  12/06/20 2029

## 2020-12-06 NOTE — ED NOTES
Bed: 21  Expected date:   Expected time:   Means of arrival: Walk In  Comments:     [facility]  12/06/20 1048

## 2020-12-17 ENCOUNTER — APPOINTMENT (OUTPATIENT)
Dept: ULTRASOUND IMAGING | Age: 25
End: 2020-12-17
Payer: COMMERCIAL

## 2020-12-17 ENCOUNTER — HOSPITAL ENCOUNTER (EMERGENCY)
Age: 25
Discharge: HOME | End: 2020-12-17
Attending: FAMILY MEDICINE
Payer: COMMERCIAL

## 2020-12-17 VITALS
HEART RATE: 98 BPM | BODY MASS INDEX: 37.18 KG/M2 | SYSTOLIC BLOOD PRESSURE: 144 MMHG | WEIGHT: 220 LBS | DIASTOLIC BLOOD PRESSURE: 83 MMHG | OXYGEN SATURATION: 98 % | TEMPERATURE: 97.8 F | RESPIRATION RATE: 16 BRPM

## 2020-12-17 LAB
A/G RATIO: 1 (ref 1.1–2.2)
ABO/RH: NORMAL
ALBUMIN SERPL-MCNC: 3.8 G/DL (ref 3.4–5)
ALP BLD-CCNC: 76 U/L (ref 40–129)
ALT SERPL-CCNC: 28 U/L (ref 10–40)
ANION GAP SERPL CALCULATED.3IONS-SCNC: 11 MMOL/L (ref 3–16)
ANTIBODY SCREEN: NORMAL
ANTIBODY SCREEN: NORMAL
AST SERPL-CCNC: 21 U/L (ref 15–37)
BASOPHILS ABSOLUTE: 0 K/UL (ref 0–0.2)
BASOPHILS RELATIVE PERCENT: 0.5 %
BILIRUB SERPL-MCNC: 0.4 MG/DL (ref 0–1)
BILIRUBIN URINE: NEGATIVE
BLOOD, URINE: NEGATIVE
BUN BLDV-MCNC: 11 MG/DL (ref 7–20)
CALCIUM SERPL-MCNC: 9.4 MG/DL (ref 8.3–10.6)
CHLORIDE BLD-SCNC: 101 MMOL/L (ref 99–110)
CLARITY: CLEAR
CO2: 22 MMOL/L (ref 21–32)
COLOR: YELLOW
CREAT SERPL-MCNC: <0.5 MG/DL (ref 0.6–1.1)
EOSINOPHILS ABSOLUTE: 0.1 K/UL (ref 0–0.6)
EOSINOPHILS RELATIVE PERCENT: 1.2 %
GFR AFRICAN AMERICAN: >60
GFR NON-AFRICAN AMERICAN: >60
GLOBULIN: 4 G/DL
GLUCOSE BLD-MCNC: 105 MG/DL (ref 70–99)
GLUCOSE URINE: NEGATIVE MG/DL
GONADOTROPIN, CHORIONIC (HCG) QUANT: NORMAL MIU/ML
HCT VFR BLD CALC: 34.9 % (ref 36–48)
HEMOGLOBIN: 11.6 G/DL (ref 12–16)
KETONES, URINE: NEGATIVE MG/DL
LEUKOCYTE ESTERASE, URINE: NEGATIVE
LYMPHOCYTES ABSOLUTE: 3.1 K/UL (ref 1–5.1)
LYMPHOCYTES RELATIVE PERCENT: 34.2 %
MCH RBC QN AUTO: 27.5 PG (ref 26–34)
MCHC RBC AUTO-ENTMCNC: 33.3 G/DL (ref 31–36)
MCV RBC AUTO: 82.8 FL (ref 80–100)
MICROSCOPIC EXAMINATION: NORMAL
MONOCYTES ABSOLUTE: 0.7 K/UL (ref 0–1.3)
MONOCYTES RELATIVE PERCENT: 7.6 %
NEUTROPHILS ABSOLUTE: 5.1 K/UL (ref 1.7–7.7)
NEUTROPHILS RELATIVE PERCENT: 56.5 %
NITRITE, URINE: NEGATIVE
PDW BLD-RTO: 13.5 % (ref 12.4–15.4)
PH UA: 5 (ref 5–8)
PLATELET # BLD: 391 K/UL (ref 135–450)
PMV BLD AUTO: 7.6 FL (ref 5–10.5)
POTASSIUM SERPL-SCNC: 4.2 MMOL/L (ref 3.5–5.1)
PROTEIN UA: NEGATIVE MG/DL
RBC # BLD: 4.22 M/UL (ref 4–5.2)
SODIUM BLD-SCNC: 134 MMOL/L (ref 136–145)
SPECIFIC GRAVITY UA: 1.02 (ref 1–1.03)
TOTAL PROTEIN: 7.8 G/DL (ref 6.4–8.2)
URINE REFLEX TO CULTURE: NORMAL
URINE TYPE: NORMAL
UROBILINOGEN, URINE: 0.2 E.U./DL
WBC # BLD: 9 K/UL (ref 4–11)

## 2020-12-17 PROCEDURE — 86901 BLOOD TYPING SEROLOGIC RH(D): CPT

## 2020-12-17 PROCEDURE — 81003 URINALYSIS AUTO W/O SCOPE: CPT

## 2020-12-17 PROCEDURE — 80053 COMPREHEN METABOLIC PANEL: CPT

## 2020-12-17 PROCEDURE — 86850 RBC ANTIBODY SCREEN: CPT

## 2020-12-17 PROCEDURE — 86900 BLOOD TYPING SEROLOGIC ABO: CPT

## 2020-12-17 PROCEDURE — 2580000003 HC RX 258: Performed by: FAMILY MEDICINE

## 2020-12-17 PROCEDURE — 99282 EMERGENCY DEPT VISIT SF MDM: CPT

## 2020-12-17 PROCEDURE — 76817 TRANSVAGINAL US OBSTETRIC: CPT

## 2020-12-17 PROCEDURE — 84702 CHORIONIC GONADOTROPIN TEST: CPT

## 2020-12-17 PROCEDURE — 6360000002 HC RX W HCPCS: Performed by: FAMILY MEDICINE

## 2020-12-17 PROCEDURE — 96374 THER/PROPH/DIAG INJ IV PUSH: CPT

## 2020-12-17 PROCEDURE — 85025 COMPLETE CBC W/AUTO DIFF WBC: CPT

## 2020-12-17 RX ORDER — ONDANSETRON 4 MG/1
4 TABLET, ORALLY DISINTEGRATING ORAL EVERY 8 HOURS PRN
Qty: 20 TABLET | Refills: 0 | Status: SHIPPED | OUTPATIENT
Start: 2020-12-17 | End: 2020-12-24

## 2020-12-17 RX ORDER — DOXYLAMINE SUCCINATE AND PYRIDOXINE HYDROCHLORIDE, DELAYED RELEASE TABLETS 10 MG/10 MG 10; 10 MG/1; MG/1
TABLET, DELAYED RELEASE ORAL
Qty: 60 TABLET | Refills: 0 | Status: SHIPPED | OUTPATIENT
Start: 2020-12-17 | End: 2020-12-30

## 2020-12-17 RX ORDER — 0.9 % SODIUM CHLORIDE 0.9 %
1000 INTRAVENOUS SOLUTION INTRAVENOUS ONCE
Status: COMPLETED | OUTPATIENT
Start: 2020-12-17 | End: 2020-12-17

## 2020-12-17 RX ORDER — ONDANSETRON 2 MG/ML
4 INJECTION INTRAMUSCULAR; INTRAVENOUS ONCE
Status: COMPLETED | OUTPATIENT
Start: 2020-12-17 | End: 2020-12-17

## 2020-12-17 RX ADMIN — ONDANSETRON 4 MG: 2 INJECTION INTRAMUSCULAR; INTRAVENOUS at 04:11

## 2020-12-17 RX ADMIN — SODIUM CHLORIDE 1000 ML: 9 INJECTION, SOLUTION INTRAVENOUS at 04:12

## 2020-12-17 ASSESSMENT — PAIN SCALES - GENERAL: PAINLEVEL_OUTOF10: 6

## 2020-12-17 NOTE — ED PROVIDER NOTES
Food insecurity     Worry: Not on file     Inability: Not on file    Transportation needs     Medical: Not on file     Non-medical: Not on file   Tobacco Use    Smoking status: Never Smoker    Smokeless tobacco: Never Used   Substance and Sexual Activity    Alcohol use: No    Drug use: No    Sexual activity: Yes     Partners: Male   Lifestyle    Physical activity     Days per week: Not on file     Minutes per session: Not on file    Stress: Not on file   Relationships    Social connections     Talks on phone: Not on file     Gets together: Not on file     Attends [employer] service: Not on file     Active member of club or organization: Not on file     Attends meetings of clubs or organizations: Not on file     Relationship status: Not on file    Intimate partner violence     Fear of current or ex partner: Not on file     Emotionally abused: Not on file     Physically abused: Not on file     Forced sexual activity: Not on file   Other Topics Concern    Not on file   Social History Narrative    Not on file     No current facility-administered medications for this encounter. Current Outpatient Medications   Medication Sig Dispense Refill    ondansetron (ZOFRAN-ODT) 4 MG disintegrating tablet Place 1 tablet under the tongue every 8 hours as needed for Nausea or Vomiting May Sub regular tablet (non-ODT) if insurance does not cover ODT. 20 tablet 0    doxylamine-pyridoxine 10-10 MG TBEC One po QHS to help prevent nausea and vomiting 60 tablet 0    Prenatal Vit-DSS-Fe Cbn-FA (PRENATAL AD) TABS Take 1 tablet by mouth daily 30 tablet 0    medroxyPROGESTERone (DEPO-PROVERA) 150 MG/ML injection Inject 150 mg into the muscle once.         Prenatal Vit-Fe Fumarate-FA (PRENATABS FA) TABS Take 1 tablet by mouth daily 30 tablet 0     Allergies   Allergen Reactions    Shellfish-Derived Products Swelling       Nursing Notes Reviewed    Physical Exam:  ED Triage Vitals [12/17/20 0110]   Enc Vitals Group      BP (!) 144/83      Pulse 98      Resp 16      Temp 97.8 °F (36.6 °C)      Temp Source Infrared      SpO2 98 %      Weight 220 lb (99.8 kg)      Height       Head Circumference       Peak Flow       Pain Score       Pain Loc       Pain Edu? Excl. in [address]? GENERAL APPEARANCE: Awake and alert. Cooperative. No acute distress. HEAD: Normocephalic. Atraumatic. EYES: EOM's grossly intact. Sclera anicteric. ENT: Mucous membranes are moist. Tolerates saliva. No trismus. NECK: Supple. No meningismus. Trachea midline. HEART: RRR. Radial pulses 2+. LUNGS: Respirations unlabored. CTAB  ABDOMEN: Soft. Non-tender. No guarding or rebound. No peritoneal signs. Negative heeltap. Negative Dumont's. Negative McBurney's. EXTREMITIES: No acute deformities. SKIN: Warm and dry. NEUROLOGICAL: No gross facial drooping. Moves all 4 extremities spontaneously. PSYCHIATRIC: Normal mood.     I have reviewed and interpreted all of the currently available lab results from this visit (if applicable):  Results for orders placed or performed during the hospital encounter of 12/17/20   CBC Auto Differential   Result Value Ref Range    WBC 9.0 4.0 - 11.0 K/uL    RBC 4.22 4.00 - 5.20 M/uL    Hemoglobin 11.6 (L) 12.0 - 16.0 g/dL    Hematocrit 34.9 (L) 36.0 - 48.0 %    MCV 82.8 80.0 - 100.0 fL    MCH 27.5 26.0 - 34.0 pg    MCHC 33.3 31.0 - 36.0 g/dL    RDW 13.5 12.4 - 15.4 %    Platelets 235 832 - 510 K/uL    MPV 7.6 5.0 - 10.5 fL    Neutrophils % 56.5 %    Lymphocytes % 34.2 %    Monocytes % 7.6 %    Eosinophils % 1.2 %    Basophils % 0.5 %    Neutrophils Absolute 5.1 1.7 - 7.7 K/uL    Lymphocytes Absolute 3.1 1.0 - 5.1 K/uL    Monocytes Absolute 0.7 0.0 - 1.3 K/uL    Eosinophils Absolute 0.1 0.0 - 0.6 K/uL    Basophils Absolute 0.0 0.0 - 0.2 K/uL   Comprehensive Metabolic Panel   Result Value Ref Range    Sodium 134 (L) 136 - 145 mmol/L    Potassium 4.2 3.5 - 5.1 mmol/L    Chloride 101 99 - 110 mmol/L    CO2 22 21 - 32 mmol/L Anion Gap 11 3 - 16    Glucose 105 (H) 70 - 99 mg/dL    BUN 11 7 - 20 mg/dL    CREATININE <0.5 (L) 0.6 - 1.1 mg/dL    GFR Non-African American >60 >60    GFR African American >60 >60    Calcium 9.4 8.3 - 10.6 mg/dL    Total Protein 7.8 6.4 - 8.2 g/dL    Alb 3.8 3.4 - 5.0 g/dL    Albumin/Globulin Ratio 1.0 (L) 1.1 - 2.2    Total Bilirubin 0.4 0.0 - 1.0 mg/dL    Alkaline Phosphatase 76 40 - 129 U/L    ALT 28 10 - 40 U/L    AST 21 15 - 37 U/L    Globulin 4.0 g/dL   hCG, quantitative, pregnancy   Result Value Ref Range    hCG Quant 68028.0 <5.0 mIU/mL   Urine, reflex to culture    Specimen: Urine, clean catch   Result Value Ref Range    Color, UA YELLOW Straw/Yellow    Clarity, UA Clear Clear    Glucose, Ur Negative Negative mg/dL    Bilirubin Urine Negative Negative    Ketones, Urine Negative Negative mg/dL    Specific Gravity, UA 1.023 1.005 - 1.030    Blood, Urine Negative Negative    pH, UA 5.0 5.0 - 8.0    Protein, UA Negative Negative mg/dL    Urobilinogen, Urine 0.2 <2.0 E.U./dL    Nitrite, Urine Negative Negative    Leukocyte Esterase, Urine Negative Negative    Microscopic Examination Not Indicated     Urine Type NotGiven     Urine Reflex to Culture Not Indicated    TYPE AND SCREEN   Result Value Ref Range    ABO/Rh A POS     Antibody Screen CANCELED    ANTIBODY SCREEN   Result Value Ref Range    Antibody Screen NEG       Radiographs (if obtained):  [] The following radiograph was interpreted by myself in the absence of a radiologist:   [] Radiologist's Report Reviewed:  US OB TRANSVAGINAL   Final Result   Single living intrauterine product of pregnancy, as discussed above.                EKG (if obtained): (All EKG's are interpreted by myself in the absence of a cardiologist)    Chart review shows recent radiographs:  Us Ob Transvaginal    Result Date: 12/3/2020  EXAMINATION: FIRST TRIMESTER OBSTETRIC ULTRASOUND 12/3/2020 TECHNIQUE: Transvaginal first trimester obstetric pelvic ultrasound was performed with color Doppler flow evaluation. COMPARISON: None HISTORY: ORDERING SYSTEM PROVIDED HISTORY: Pregnant and Pelvic Pain TECHNOLOGIST PROVIDED HISTORY: Reason for exam:->Pregnant and Pelvic Pain FINDINGS: Uterus: 8.4 cm and is anteverted in position. Gestational Sac(s):  No sonographic visualization of gestational sac. Endometrial stripe measures 1.7 mm Yolk Sac:  Not visualized Fetal Pole:  Not visualized Crown Rump Length:  Not measured Fetal Heart Rate:  Not sonographically detected Right ovary: 3.0 x 2.6 x 2.4 cm. Right adnexal tubal ring is seen is noted within the right ovary which measures up to 1.2 cm in diameter and is hyperechoic relative to surrounding ovarian parenchyma with increased blood flow on color Doppler evaluation. Left ovary: 2.1 x 2.0 x 1.9 cm Free fluid: Trace pelvic free fluid is seen surrounding the right adnexa. 1. Right ovarian tubal ring with echogenic properties and increased flow on color Doppler evaluation concerning for presence of ectopic pregnancy. No sonographic visualization of fetal pole or yolk sac. 2. No sonographic evidence of intrauterine pregnancy. 3. Trace pelvic free fluid surrounding right adnexa. Findings were discussed with physician assistant, Steve Ruvalcaba at 1:42 am on 12/3/2020. MDM:  79-year-old G6, P1 female at approximately 6 weeks with previous ultrasound concerning for ectopic pregnancy and no documentation of intrauterine pregnancy presents with sounds like nausea vomiting associated with pregnancy. She presents with normal vital signs hemodynamically stable. 0500: No leukocytosis. Hemoglobin 11.6 and stable with no evidence of acute blood loss. Kidney function normal.  Glucose normal.  No metabolic acidosis. Liver function enzymes normal.  Urinalysis pending. Quantitative hCG 25,000. Transvaginal ultrasound ordered. Patient herself feels much better after 1 L of fluids and Zofran.   No additional vomiting while in the emergency department. 0700: IUP on ultrasound. Patient tolerating p.o. Urinalysis negative. Discharged home with Diclegis, Zofran, follow-up with OB/GYN. I estimate there is LOW risk for (including but not limited to) ACUTE APPENDICITIS, BOWEL OBSTRUCTION, ACUTE CHOLECYSTITIS, RUPTURED DIVERTICULITIS, INCARCERATED or STRANGULATED HERNIA, HEMMORHAGIC PANCREATITIS, PERFORATED BOWEL/ULCER, ECTOPIC PREGNANCY, OVARIAN TORSION or TUBO-OVARIAN ABSCESS thus I consider the discharge disposition reasonable. Dara Henning (or their surrogate) and I have discussed the diagnosis and risks, and we agree with discharging home with close follow-up. We also discussed returning to the Emergency Department immediately if new or worsening symptoms occur. We have discussed the symptoms which are most concerning that necessitate immediate return. Clinical Impression:  1. Nausea and vomiting in adult    2. Complication of pregnancy in first trimester      Disposition referral (if applicable): Your regular Ob/Gyn    Schedule an appointment as soon as possible for a visit       Disposition medications (if applicable):  New Prescriptions    DOXYLAMINE-PYRIDOXINE 10-10 MG TBEC    One po QHS to help prevent nausea and vomiting    ONDANSETRON (ZOFRAN-ODT) 4 MG DISINTEGRATING TABLET    Place 1 tablet under the tongue every 8 hours as needed for Nausea or Vomiting May Sub regular tablet (non-ODT) if insurance does not cover ODT. Comment: Please note this report has been produced using speech recognition software and may contain errors related to that system including errors in grammar, punctuation, and spelling, as well as words and phrases that may be inappropriate. If there are any questions or concerns please feel free to contact the dictating provider for clarification.       Diana Luna MD  12/17/20 0675

## 2020-12-30 ENCOUNTER — HOSPITAL ENCOUNTER (EMERGENCY)
Age: 25
Discharge: HOME | End: 2020-12-31
Attending: EMERGENCY MEDICINE
Payer: COMMERCIAL

## 2020-12-30 ENCOUNTER — APPOINTMENT (OUTPATIENT)
Dept: ULTRASOUND IMAGING | Age: 25
End: 2020-12-30
Payer: COMMERCIAL

## 2020-12-30 VITALS
WEIGHT: 220 LBS | HEART RATE: 93 BPM | OXYGEN SATURATION: 99 % | BODY MASS INDEX: 37.18 KG/M2 | TEMPERATURE: 97.8 F | SYSTOLIC BLOOD PRESSURE: 141 MMHG | DIASTOLIC BLOOD PRESSURE: 89 MMHG | RESPIRATION RATE: 18 BRPM

## 2020-12-30 LAB
ABO/RH: NORMAL
ANION GAP SERPL CALCULATED.3IONS-SCNC: 8 MMOL/L (ref 3–16)
BUN BLDV-MCNC: 7 MG/DL (ref 7–20)
CALCIUM SERPL-MCNC: 9.6 MG/DL (ref 8.3–10.6)
CHLORIDE BLD-SCNC: 103 MMOL/L (ref 99–110)
CO2: 24 MMOL/L (ref 21–32)
CREAT SERPL-MCNC: <0.5 MG/DL (ref 0.6–1.1)
GFR AFRICAN AMERICAN: >60
GFR NON-AFRICAN AMERICAN: >60
GLUCOSE BLD-MCNC: 117 MG/DL (ref 70–99)
GONADOTROPIN, CHORIONIC (HCG) QUANT: NORMAL MIU/ML
POTASSIUM SERPL-SCNC: 4.1 MMOL/L (ref 3.5–5.1)
SODIUM BLD-SCNC: 135 MMOL/L (ref 136–145)

## 2020-12-30 PROCEDURE — 80048 BASIC METABOLIC PNL TOTAL CA: CPT

## 2020-12-30 PROCEDURE — 85025 COMPLETE CBC W/AUTO DIFF WBC: CPT

## 2020-12-30 PROCEDURE — 84702 CHORIONIC GONADOTROPIN TEST: CPT

## 2020-12-30 PROCEDURE — 86900 BLOOD TYPING SEROLOGIC ABO: CPT

## 2020-12-30 PROCEDURE — 99282 EMERGENCY DEPT VISIT SF MDM: CPT

## 2020-12-30 PROCEDURE — 76817 TRANSVAGINAL US OBSTETRIC: CPT

## 2020-12-30 PROCEDURE — 86901 BLOOD TYPING SEROLOGIC RH(D): CPT

## 2020-12-30 PROCEDURE — 83690 ASSAY OF LIPASE: CPT

## 2020-12-30 PROCEDURE — 80076 HEPATIC FUNCTION PANEL: CPT

## 2020-12-30 ASSESSMENT — PAIN SCALES - GENERAL: PAINLEVEL_OUTOF10: 6

## 2020-12-31 LAB
ALBUMIN SERPL-MCNC: 3.9 G/DL (ref 3.4–5)
ALP BLD-CCNC: 80 U/L (ref 40–129)
ALT SERPL-CCNC: 57 U/L (ref 10–40)
AST SERPL-CCNC: 35 U/L (ref 15–37)
BASOPHILS ABSOLUTE: 0 K/UL (ref 0–0.2)
BASOPHILS RELATIVE PERCENT: 0.3 %
BILIRUB SERPL-MCNC: <0.2 MG/DL (ref 0–1)
BILIRUBIN DIRECT: <0.2 MG/DL (ref 0–0.3)
BILIRUBIN URINE: NEGATIVE
BILIRUBIN, INDIRECT: ABNORMAL MG/DL (ref 0–1)
BLOOD, URINE: NEGATIVE
CLARITY: ABNORMAL
COLOR: YELLOW
EOSINOPHILS ABSOLUTE: 0.1 K/UL (ref 0–0.6)
EOSINOPHILS RELATIVE PERCENT: 0.8 %
EPITHELIAL CELLS, UA: 3 /HPF (ref 0–5)
GLUCOSE URINE: NEGATIVE MG/DL
HCT VFR BLD CALC: 33.9 % (ref 36–48)
HEMOGLOBIN: 11.3 G/DL (ref 12–16)
HYALINE CASTS: 1 /LPF (ref 0–8)
KETONES, URINE: NEGATIVE MG/DL
LEUKOCYTE ESTERASE, URINE: NEGATIVE
LIPASE: 53 U/L (ref 13–60)
LYMPHOCYTES ABSOLUTE: 2.5 K/UL (ref 1–5.1)
LYMPHOCYTES RELATIVE PERCENT: 27.2 %
MCH RBC QN AUTO: 27.8 PG (ref 26–34)
MCHC RBC AUTO-ENTMCNC: 33.3 G/DL (ref 31–36)
MCV RBC AUTO: 83.4 FL (ref 80–100)
MICROSCOPIC EXAMINATION: YES
MONOCYTES ABSOLUTE: 0.6 K/UL (ref 0–1.3)
MONOCYTES RELATIVE PERCENT: 7 %
NEUTROPHILS ABSOLUTE: 6 K/UL (ref 1.7–7.7)
NEUTROPHILS RELATIVE PERCENT: 64.7 %
NITRITE, URINE: NEGATIVE
PDW BLD-RTO: 14 % (ref 12.4–15.4)
PH UA: 6.5 (ref 5–8)
PLATELET # BLD: 426 K/UL (ref 135–450)
PMV BLD AUTO: 8.2 FL (ref 5–10.5)
PROTEIN UA: NEGATIVE MG/DL
RBC # BLD: 4.06 M/UL (ref 4–5.2)
RBC UA: 1 /HPF (ref 0–4)
SPECIFIC GRAVITY UA: 1.02 (ref 1–1.03)
TOTAL PROTEIN: 8 G/DL (ref 6.4–8.2)
URINE REFLEX TO CULTURE: ABNORMAL
URINE TYPE: ABNORMAL
UROBILINOGEN, URINE: 0.2 E.U./DL
WBC # BLD: 9.3 K/UL (ref 4–11)
WBC UA: 1 /HPF (ref 0–5)

## 2020-12-31 PROCEDURE — 81001 URINALYSIS AUTO W/SCOPE: CPT

## 2020-12-31 RX ORDER — POLYETHYLENE GLYCOL 3350 17 G/17G
17 POWDER, FOR SOLUTION ORAL 2 TIMES DAILY PRN
Qty: 510 G | Refills: 0 | Status: SHIPPED | OUTPATIENT
Start: 2020-12-31 | End: 2021-01-30

## 2020-12-31 NOTE — ED PROVIDER NOTES
[facility] Emergency Department    CHIEF COMPLAINT  Chief Complaint   Patient presents with    Abdominal Cramping     pt. is 8 weeks 1 days pregnant and is c/o constipation with severe abd cramping and spotting. LMP-11/3/2020. pt. has had prenatal care. HISTORY OF PRESENT ILLNESS  Aaron Negrete is a 22 y.o. female  who presents to the ED complaining of abdominal and pelvic cramping when lifting totes at work. No fevers. Feels constipated. Had some vaginal spotting as well today but no active bleeding anymore and the cramping has been a few days. No chest pains. On PNV, follows with Dr. Mrak Barker with [address]. She is [id number] (4x miscarriages). LMP 11/3, estimates 8w1d pregnant. No dysuria or hematuria. No other complaints, modifying factors or associated symptoms. I have reviewed the following from the nursing documentation.     Past Medical History:   Diagnosis Date    Asthma     Herpes     genital type 2     Past Surgical History:   Procedure Laterality Date    KNEE SURGERY Right      Family History   Problem Relation Age of Onset    High Cholesterol Mother     Diabetes Maternal Grandmother      Social History     Socioeconomic History    Marital status: Single     Spouse name: Not on file    Number of children: Not on file    Years of education: Not on file    Highest education level: Not on file   Occupational History    Not on file   Social Needs    Financial resource strain: Not on file    Food insecurity     Worry: Not on file     Inability: Not on file    Transportation needs     Medical: Not on file     Non-medical: Not on file   Tobacco Use    Smoking status: Never Smoker    Smokeless tobacco: Never Used   Substance and Sexual Activity    Alcohol use: No    Drug use: No    Sexual activity: Yes     Partners: Male   Lifestyle    Physical activity     Days per week: Not on file     Minutes per session: Not on file  Stress: Not on file   Relationships    Social connections     Talks on phone: Not on file     Gets together: Not on file     Attends Adventism service: Not on file     Active member of club or organization: Not on file     Attends meetings of clubs or organizations: Not on file     Relationship status: Not on file    Intimate partner violence     Fear of current or ex partner: Not on file     Emotionally abused: Not on file     Physically abused: Not on file     Forced sexual activity: Not on file   Other Topics Concern    Not on file   Social History Narrative    Not on file     No current facility-administered medications for this encounter. Current Outpatient Medications   Medication Sig Dispense Refill    polyethylene glycol (MIRALAX) 17 GM/SCOOP powder Take 17 g by mouth 2 times daily as needed (constipation) 510 g 0    Prenatal Vit-DSS-Fe Cbn-FA (PRENATAL AD) TABS Take 1 tablet by mouth daily 30 tablet 0     Allergies   Allergen Reactions    Shellfish-Derived Products Swelling       REVIEW OF SYSTEMS  10 systems reviewed, pertinent positives per HPI otherwise noted to be negative. PHYSICAL EXAM  BP (!) 141/89   Pulse 93   Temp 97.8 °F (36.6 °C) (Infrared)   Resp 18   Wt 220 lb (99.8 kg)   LMP 11/03/2020   SpO2 99%   BMI 37.18 kg/m²    GENERAL APPEARANCE: Awake and alert. Cooperative. No distress. HENT: Normocephalic. Atraumatic. Mucous membranes are moist.  NECK: Supple. EYES: PERRL. EOM's grossly intact. HEART/CHEST: RRR. No murmurs. No chest wall tenderness. LUNGS: Respirations unlabored. CTAB. Good air exchange. Speaking comfortably in full sentences. ABDOMEN: Mild suprapubic ttp, no other abdominal tenderness. Soft. Non-distended. No masses. No organomegaly. No guarding or rebound. Normal bowel sounds throughout. MUSCULOSKELETAL: No extremity edema. Compartments soft. No deformity. No tenderness in the extremities. All extremities neurovascularly intact. SKIN: Warm and dry. No acute rashes. NEUROLOGICAL: Alert and oriented. CN's 2-12 intact. No gross facial drooping. Strength 5/5, sensation intact. 2 plus DTR's in knees bilaterally. Gait normal.  PSYCHIATRIC: Normal mood and affect. LABS  I have reviewed all labs for this visit.    Results for orders placed or performed during the hospital encounter of 06/70/69   Basic Metabolic Panel   Result Value Ref Range    Sodium 135 (L) 136 - 145 mmol/L    Potassium 4.1 3.5 - 5.1 mmol/L    Chloride 103 99 - 110 mmol/L    CO2 24 21 - 32 mmol/L    Anion Gap 8 3 - 16    Glucose 117 (H) 70 - 99 mg/dL    BUN 7 7 - 20 mg/dL    CREATININE <0.5 (L) 0.6 - 1.1 mg/dL    GFR Non-African American >60 >60    GFR African American >60 >60    Calcium 9.6 8.3 - 10.6 mg/dL   HCG, Quantitative, Pregnancy   Result Value Ref Range    hCG Quant 876197.0 <5.0 mIU/mL   Urinalysis Reflex to Culture    Specimen: Urine, clean catch   Result Value Ref Range    Color, UA YELLOW Straw/Yellow    Clarity, UA CLOUDY (A) Clear    Glucose, Ur Negative Negative mg/dL    Bilirubin Urine Negative Negative    Ketones, Urine Negative Negative mg/dL    Specific Gravity, UA 1.023 1.005 - 1.030    Blood, Urine Negative Negative    pH, UA 6.5 5.0 - 8.0    Protein, UA Negative Negative mg/dL    Urobilinogen, Urine 0.2 <2.0 E.U./dL    Nitrite, Urine Negative Negative    Leukocyte Esterase, Urine Negative Negative    Microscopic Examination YES     Urine Type NotGiven    CBC auto differential   Result Value Ref Range    WBC 9.3 4.0 - 11.0 K/uL    RBC 4.06 4.00 - 5.20 M/uL    Hemoglobin 11.3 (L) 12.0 - 16.0 g/dL    Hematocrit 33.9 (L) 36.0 - 48.0 %    MCV 83.4 80.0 - 100.0 fL    MCH 27.8 26.0 - 34.0 pg    MCHC 33.3 31.0 - 36.0 g/dL    RDW 14.0 12.4 - 15.4 %    Platelets 650 884 - 955 K/uL    MPV 8.2 5.0 - 10.5 fL    Neutrophils % 64.7 %    Lymphocytes % 27.2 %    Monocytes % 7.0 %    Eosinophils % 0.8 %    Basophils % 0.3 %    Neutrophils Absolute 6.0 1.7 - 7.7 K/uL Lymphocytes Absolute 2.5 1.0 - 5.1 K/uL    Monocytes Absolute 0.6 0.0 - 1.3 K/uL    Eosinophils Absolute 0.1 0.0 - 0.6 K/uL    Basophils Absolute 0.0 0.0 - 0.2 K/uL   Lipase   Result Value Ref Range    Lipase 53.0 13.0 - 60.0 U/L   Hepatic function panel   Result Value Ref Range    Total Protein 8.0 6.4 - 8.2 g/dL    Alb 3.9 3.4 - 5.0 g/dL    Alkaline Phosphatase 80 40 - 129 U/L    ALT 57 (H) 10 - 40 U/L    AST 35 15 - 37 U/L    Total Bilirubin <0.2 0.0 - 1.0 mg/dL    Bilirubin, Direct <0.2 0.0 - 0.3 mg/dL    Bilirubin, Indirect see below 0.0 - 1.0 mg/dL   ABO/RH   Result Value Ref Range    ABO/Rh A POS      RADIOLOGY    Us Ob Transvaginal    Result Date: 12/30/2020  EXAMINATION: FIRST TRIMESTER OBSTETRIC ULTRASOUND 12/30/2020 TECHNIQUE: Transvaginal first trimester obstetric pelvic ultrasound was performed with color Doppler flow evaluation. COMPARISON: 12/17/2020.  12/03/2020. HISTORY: ORDERING SYSTEM PROVIDED HISTORY: Pregnant and Pelvic Pain TECHNOLOGIST PROVIDED HISTORY: Reason for exam:->Pregnant and Pelvic Pain Acuity: Acute Type of Exam: Ongoing Additional signs and symptoms: na Relevant Medical/Surgical History: na FINDINGS: Uterus: The uterus measures approximately 12.3 x 6.8 x 8 cm. Gestational Sac(s):  Single normal appearing gestational sac. No evidence of subchorionic hemorrhage. Yolk Sac:  Present Fetal Pole:  Single fetal pole Crown Rump Length:  1.6 cm Fetal Heart Rate:  170 beats per minute. Right ovary: The right ovary was suboptimally visualized and measures approximately 2.6 x 4.2 x 2.6 cm. Normal blood flow was noted. Left ovary: The left ovary was not visualized. Free fluid: No evidence of free fluid is seen. Measurements: Estimated gestational age by current ultrasound: 8 weeks 1 day Estimated Due Date: 08/10/2021     Single live intrauterine gestation with estimated gestational age of approximately 11 weeks 1 day.  Please note that the left ovary was not visualized on this study ED COURSE/MDM  Patient seen and evaluated. Old records reviewed. Labs and imaging reviewed and results discussed with patient. After initial evaluation, differential diagnostic considerations included: pelvic inflammatory disease, TOA, ovarian torsion, kidney stone, pyelonephritis, UTI, BV/vaginitis, cervicitis, ovarian cysts, round ligament pain, pregnancy (including ectopic), appendicitis, bowel obstruction, diverticulitis, hernia, gastroenteritis    The patient's ED workup was notable for abd pain with pregnancy and spotting in pregnancy. Denies active bleeding, Rh+ blood type (no RhoGAM). On PNV and has PNC. F/u with her OB in 2-3 days for reevaluation, strict return precautions. IUP 8w1d c/w dates seen on u/s without complications evident. No focal LLQ pain despite nonvisualized L ovary, doubt torsion/TOA. VS reassuring. UA without UTI. Miralax for constipation PRN. Tylenol only for pain due to pregnancy. CLINICAL IMPRESSION  1. Abdominal pain during pregnancy, first trimester    2. Spotting in pregnancy    3. Constipation, unspecified constipation type        Blood pressure (!) 141/89, pulse 93, temperature 97.8 °F (36.6 °C), temperature source Infrared, resp. rate 18, weight 220 lb (99.8 kg), last menstrual period 11/03/2020, SpO2 99 %, unknown if currently breastfeeding. Woo Low was discharged to home in stable condition. I have discussed the findings of today's workup with the patient and addressed the patient's questions and concerns. Important warning signs as well as new or worsening symptoms which would necessitate immediate return to the ED were discussed. The plan is to discharge from the ED at this time, and the patient is in stable condition. The patient acknowledged understanding is agreeable with this plan. Patient was given scripts for the following medications. I counseled patient how to take these medications.    New Prescriptions POLYETHYLENE GLYCOL (MIRALAX) 17 GM/SCOOP POWDER    Take 17 g by mouth 2 times daily as needed (constipation)       Follow-up with: Monica Hurtado MD  [address]  [phone]    Schedule an appointment as soon as possible for a visit in 2 days  For symptom re-evaluation    [facility] Emergency Department  [address]  Go to   If symptoms worsen      DISCLAIMER: This chart was created using Dragon dictation software. Efforts were made by me to ensure accuracy, however some errors may be present due to limitations of this technology and occasionally words are not transcribed correctly.         Stas Hanley MD  12/31/20 5873

## 2021-01-30 ENCOUNTER — APPOINTMENT (OUTPATIENT)
Dept: ULTRASOUND IMAGING | Age: 26
End: 2021-01-30
Payer: COMMERCIAL

## 2021-01-30 ENCOUNTER — HOSPITAL ENCOUNTER (EMERGENCY)
Age: 26
Discharge: HOME | End: 2021-01-30
Attending: EMERGENCY MEDICINE
Payer: COMMERCIAL

## 2021-01-30 VITALS
HEIGHT: 65 IN | SYSTOLIC BLOOD PRESSURE: 116 MMHG | WEIGHT: 230 LBS | BODY MASS INDEX: 38.32 KG/M2 | HEART RATE: 94 BPM | TEMPERATURE: 98.4 F | OXYGEN SATURATION: 98 % | RESPIRATION RATE: 18 BRPM | DIASTOLIC BLOOD PRESSURE: 71 MMHG

## 2021-01-30 DIAGNOSIS — R10.9 ABDOMINAL PAIN DURING PREGNANCY IN SECOND TRIMESTER: Primary | ICD-10-CM

## 2021-01-30 DIAGNOSIS — O26.892 ABDOMINAL PAIN DURING PREGNANCY IN SECOND TRIMESTER: Primary | ICD-10-CM

## 2021-01-30 LAB
A/G RATIO: 0.9 (ref 1.1–2.2)
ABO/RH: NORMAL
ALBUMIN SERPL-MCNC: 3.8 G/DL (ref 3.4–5)
ALP BLD-CCNC: 85 U/L (ref 40–129)
ALT SERPL-CCNC: 15 U/L (ref 10–40)
ANION GAP SERPL CALCULATED.3IONS-SCNC: 12 MMOL/L (ref 3–16)
AST SERPL-CCNC: 15 U/L (ref 15–37)
BASOPHILS ABSOLUTE: 0 K/UL (ref 0–0.2)
BASOPHILS RELATIVE PERCENT: 0.6 %
BILIRUB SERPL-MCNC: <0.2 MG/DL (ref 0–1)
BILIRUBIN URINE: NEGATIVE
BLOOD, URINE: NEGATIVE
BUN BLDV-MCNC: 7 MG/DL (ref 7–20)
CALCIUM SERPL-MCNC: 9.3 MG/DL (ref 8.3–10.6)
CHLORIDE BLD-SCNC: 102 MMOL/L (ref 99–110)
CLARITY: CLEAR
CO2: 22 MMOL/L (ref 21–32)
COLOR: YELLOW
CREAT SERPL-MCNC: <0.5 MG/DL (ref 0.6–1.1)
EOSINOPHILS ABSOLUTE: 0.1 K/UL (ref 0–0.6)
EOSINOPHILS RELATIVE PERCENT: 1.1 %
GFR AFRICAN AMERICAN: >60
GFR NON-AFRICAN AMERICAN: >60
GLOBULIN: 4.2 G/DL
GLUCOSE BLD-MCNC: 97 MG/DL (ref 70–99)
GLUCOSE URINE: NEGATIVE MG/DL
GONADOTROPIN, CHORIONIC (HCG) QUANT: NORMAL MIU/ML
HCT VFR BLD CALC: 35.6 % (ref 36–48)
HEMOGLOBIN: 11.7 G/DL (ref 12–16)
KETONES, URINE: NEGATIVE MG/DL
LEUKOCYTE ESTERASE, URINE: NEGATIVE
LIPASE: 47 U/L (ref 13–60)
LYMPHOCYTES ABSOLUTE: 2.2 K/UL (ref 1–5.1)
LYMPHOCYTES RELATIVE PERCENT: 30.9 %
MCH RBC QN AUTO: 27.9 PG (ref 26–34)
MCHC RBC AUTO-ENTMCNC: 32.9 G/DL (ref 31–36)
MCV RBC AUTO: 84.8 FL (ref 80–100)
MICROSCOPIC EXAMINATION: NORMAL
MONOCYTES ABSOLUTE: 0.6 K/UL (ref 0–1.3)
MONOCYTES RELATIVE PERCENT: 8.5 %
NEUTROPHILS ABSOLUTE: 4.1 K/UL (ref 1.7–7.7)
NEUTROPHILS RELATIVE PERCENT: 58.9 %
NITRITE, URINE: NEGATIVE
PDW BLD-RTO: 14.6 % (ref 12.4–15.4)
PH UA: 6 (ref 5–8)
PLATELET # BLD: 375 K/UL (ref 135–450)
PMV BLD AUTO: 7.4 FL (ref 5–10.5)
POTASSIUM REFLEX MAGNESIUM: 4.1 MMOL/L (ref 3.5–5.1)
PROTEIN UA: NEGATIVE MG/DL
RBC # BLD: 4.19 M/UL (ref 4–5.2)
SODIUM BLD-SCNC: 136 MMOL/L (ref 136–145)
SPECIFIC GRAVITY UA: 1.03 (ref 1–1.03)
TOTAL PROTEIN: 8 G/DL (ref 6.4–8.2)
URINE REFLEX TO CULTURE: NORMAL
URINE TYPE: NORMAL
UROBILINOGEN, URINE: 1 E.U./DL
WBC # BLD: 7 K/UL (ref 4–11)

## 2021-01-30 PROCEDURE — 99283 EMERGENCY DEPT VISIT LOW MDM: CPT

## 2021-01-30 PROCEDURE — 80053 COMPREHEN METABOLIC PANEL: CPT

## 2021-01-30 PROCEDURE — 86901 BLOOD TYPING SEROLOGIC RH(D): CPT

## 2021-01-30 PROCEDURE — 84702 CHORIONIC GONADOTROPIN TEST: CPT

## 2021-01-30 PROCEDURE — 76817 TRANSVAGINAL US OBSTETRIC: CPT

## 2021-01-30 PROCEDURE — 81003 URINALYSIS AUTO W/O SCOPE: CPT

## 2021-01-30 PROCEDURE — 83690 ASSAY OF LIPASE: CPT

## 2021-01-30 PROCEDURE — 86900 BLOOD TYPING SEROLOGIC ABO: CPT

## 2021-01-30 PROCEDURE — 6370000000 HC RX 637 (ALT 250 FOR IP): Performed by: PHYSICIAN ASSISTANT

## 2021-01-30 PROCEDURE — 85025 COMPLETE CBC W/AUTO DIFF WBC: CPT

## 2021-01-30 RX ORDER — ALBUTEROL SULFATE 0.63 MG/3ML
1 SOLUTION RESPIRATORY (INHALATION) EVERY 6 HOURS PRN
COMMUNITY

## 2021-01-30 RX ORDER — ACETAMINOPHEN 500 MG
1000 TABLET ORAL ONCE
Status: COMPLETED | OUTPATIENT
Start: 2021-01-30 | End: 2021-01-30

## 2021-01-30 RX ORDER — VALACYCLOVIR HYDROCHLORIDE 500 MG/1
500 TABLET, FILM COATED ORAL 2 TIMES DAILY
COMMUNITY

## 2021-01-30 RX ADMIN — ACETAMINOPHEN 1000 MG: 500 TABLET ORAL at 13:07

## 2021-01-30 ASSESSMENT — PAIN SCALES - WONG BAKER: WONGBAKER_NUMERICALRESPONSE: 4

## 2021-01-30 ASSESSMENT — PAIN SCALES - GENERAL
PAINLEVEL_OUTOF10: 8
PAINLEVEL_OUTOF10: 10

## 2021-01-30 ASSESSMENT — PAIN DESCRIPTION - PAIN TYPE: TYPE: ACUTE PAIN

## 2021-01-30 ASSESSMENT — ENCOUNTER SYMPTOMS
COUGH: 0
CHEST TIGHTNESS: 0
BACK PAIN: 0
CONSTIPATION: 0
SHORTNESS OF BREATH: 0
NAUSEA: 0
COLOR CHANGE: 0
RESPIRATORY NEGATIVE: 1
ABDOMINAL PAIN: 1
VOMITING: 0
DIARRHEA: 0

## 2021-01-30 NOTE — ED PROVIDER NOTES
[facility] Emergency Department      Pt Name: Griselda Perches  MRN: [id number]  [place] 1995  Date of evaluation: 1/30/2021  Provider: Darcie Chiang MD  I independently performed a history and physical on Griselda Perches. All diagnostic, treatment, and disposition decisions were made by myself in conjunction with the advanced practice provider. HPI: Griselda Perches presented with   Chief Complaint   Patient presents with    Abdominal Pain     12 WEEKS PREGNANT. RADIATES TO LOWER BACK. DENIES VAGINAL BLEEDING. Griselda Perches has a past medical history of Asthma, Herpes, and Miscarriage. She has a past surgical history that includes knee surgery (Right). No current facility-administered medications on file prior to encounter. Current Outpatient Medications on File Prior to Encounter   Medication Sig Dispense Refill    valACYclovir (VALTREX) 500 MG tablet Take 500 mg by mouth 2 times daily      albuterol (ACCUNEB) 0.63 MG/3ML nebulizer solution Take 1 ampule by nebulization every 6 hours as needed for Wheezing      Prenatal Vit-DSS-Fe Cbn-FA (PRENATAL AD) TABS Take 1 tablet by mouth daily 30 tablet 0     PHYSICAL EXAM  Vitals: /71   Pulse 94   Temp 98.4 °F (36.9 °C) (Oral)   Resp 18   Ht 5' 5\" (1.651 m)   Wt 230 lb (104.3 kg)   LMP 11/03/2020   SpO2 98%   BMI 38.27 kg/m²   Constitutional:  22 y.o. female alert  HENT:  Atraumatic, oral mucosa moist  Neck:  No visible JVD, supple  Chest/Lungs:  Respiratory effort normal  Abdomen:  Non-distended, soft, nt, no tenderness at McBurney's point, negative Dumont's, no r/g   Back:  No gross deformity  Extremities:  Normal tone and perfusion    Medical Decision Making and Plan: Briefly, this is an 22 y. o.female who presented with concern for abdominal cramping, currently 12 weeks pregnant. Has a 3year-old at home and has had multiple miscarriages also so was concerned about the discomfort. Currently not experiencing pain.   Her work-up and clinical exam is benign. We do not believe the abdominal discomfort is from a source such as ectopic or active miscarriage. Doubt appy, obstruction, cholangitis, perforation, torsion, pyelonephritis, etc.  We have advised outpatient followup with her obstetrician. Sergio Handy was given appropriate discharge instructions. Referral to follow up provider. For further details of [facility] Emergency Department encounter, please see documentation by advanced practice provider SEVERO Thomson.     Labs Reviewed   CBC WITH AUTO DIFFERENTIAL - Abnormal; Notable for the following components:       Result Value    Hemoglobin 11.7 (*)     Hematocrit 35.6 (*)     All other components within normal limits    Narrative:     Performed at:  [facility] [address]   Phone [phone]   COMPREHENSIVE METABOLIC PANEL W/ REFLEX TO MG FOR LOW K - Abnormal; Notable for the following components:    CREATININE <0.5 (*)     Albumin/Globulin Ratio 0.9 (*)     All other components within normal limits    Narrative:     Performed at:  [facility]  [address], [place]   Phone [phone]   LIPASE    Narrative:     Performed at:  [facility] [address]   Phone [phone]   URINE RT REFLEX TO CULTURE    Narrative:     Performed at:  [facility]  [address], [place]   Phone [phone]   HCG, QUANTITATIVE, PREGNANCY    Narrative:     Performed at:  [facility]  [address], [place]   Phone [phone]   ABO/RH    Narrative:     Performed at:  [facility]  [address], [place]   Phone [phone]     RADIOLOGY:     [place] TRANSVAGINAL   Final Result   Iyer intrauterine pregnancy with normal fetal heart motion,   corresponding to 12 weeks 2 days. FOLLOW UP:    your gynecologist          [facility] Emergency Department  [address] [facility]  [address] [address]  [phone]  Go to   If symptoms worsen    FINAL IMPRESSION:    1.  Abdominal pain during pregnancy in second trimester       DISPOSITION Decision To Discharge 01/30/2021 02:38:55 PM       Yury Arredondo MD  01/30/21 5775

## 2021-01-30 NOTE — ED PROVIDER NOTES
[facility]        Pt Name: Sergio Handy  MRN: [id number]  [place] 1995  Date of evaluation: 2021  Provider: SEVERO Zepeda  PCP: Niko Jolly MD     I have seen and evaluated this patient with my supervising physician Florian Le       Chief Complaint   Patient presents with    Abdominal Pain     12 WEEKS PREGNANT. RADIATES TO LOWER BACK. DENIES VAGINAL BLEEDING. HISTORY OF PRESENT ILLNESS   (Location, Timing/Onset, Context/Setting, Quality, Duration, Modifying Factors, Severity, Associated Signs and Symptoms)  Note limiting factors. Sergio Handy is a 22 y.o. female with past medical history of miscarriage x4, asthma and genital herpes who presents to the ED with complaint of abdominal pain. Patient dates she is 12 weeks pregnant. States she follows up with OB/GYN through [facility]. Patient that she has seen OB/GYN through this pregnancy. States she is already had ultrasound which can showed IUP. Patient states since last night around 6 PM she is are developing some diffuse lower abdominal and lower back cramping that she rates as a 10/10. Denies taking any over-the-counter medication for symptom control. Denies any bleeding or discharge. Denies nausea or vomiting. Denies dysuria, frequency, urgency or hematuria. Denies changes in bowel movements. Became concerned and came to the ED for further evaluation and treatment. Patient states she is G6, P1 AB 4. Denies any injury or trauma. States she has history of previous  but denies any other surgeries to her abdomen the past.    Nursing Notes were all reviewed and agreed with or any disagreements were addressed in the HPI. REVIEW OF SYSTEMS    (2-9 systems for level 4, 10 or more for level 5)     Review of Systems   Constitutional: Negative for activity change, appetite change, chills, diaphoresis, fatigue and fever. No       SCREENINGS             PHYSICAL EXAM    (up to 7 for level 4, 8 or more for level 5)     ED Triage Vitals   BP Temp Temp src Pulse Resp SpO2 Height Weight   -- -- -- -- -- -- -- --       Physical Exam  Constitutional:       General: She is not in acute distress. Appearance: Normal appearance. She is well-developed. She is not ill-appearing, toxic-appearing or diaphoretic. HENT:      Head: Normocephalic and atraumatic. Right Ear: External ear normal.      Left Ear: External ear normal.   Eyes:      General:         Right eye: No discharge. Left eye: No discharge. Neck:      Musculoskeletal: Normal range of motion and neck supple. Cardiovascular:      Rate and Rhythm: Normal rate and regular rhythm. Pulses: Normal pulses. Heart sounds: Normal heart sounds. No murmur. No friction rub. No gallop. Pulmonary:      Effort: Pulmonary effort is normal. No respiratory distress. Breath sounds: Normal breath sounds. No stridor. No wheezing, rhonchi or rales. Chest:      Chest wall: No tenderness. Abdominal:      General: Abdomen is flat. Bowel sounds are normal. There is no distension. Palpations: Abdomen is soft. There is no mass. Tenderness: There is abdominal tenderness (mild) in the right lower quadrant, suprapubic area and left lower quadrant. There is no right CVA tenderness, left CVA tenderness, guarding or rebound. Negative signs include Dumont's sign, Rovsing's sign and McBurney's sign. Hernia: No hernia is present. Musculoskeletal: Normal range of motion. Skin:     General: Skin is warm and dry. Coloration: Skin is not pale. Findings: No erythema or rash. Neurological:      Mental Status: She is alert and oriented to person, place, and time.    Psychiatric:         Behavior: Behavior normal.         DIAGNOSTIC RESULTS   LABS:    Labs Reviewed   CBC WITH AUTO DIFFERENTIAL - Abnormal; Notable for the following components:       Result Value    Hemoglobin 11.7 (*)     Hematocrit 35.6 (*)     All other components within normal limits    Narrative:     Performed at:  [facility] [address], [facility], [facility] [employer]   Phone [phone]   [address] TO MG FOR LOW K - Abnormal; Notable for the following components:    CREATININE <0.5 (*)     Albumin/Globulin Ratio 0.9 (*)     All other components within normal limits    Narrative:     Performed at:  [facility] [address], [facility], [address] [facility] [employer]   Phone [phone]   LIPASE    Narrative:     Performed at:  [facility] [address], [facility], [facility] [employer]   Phone [phone]   URINE RT REFLEX TO CULTURE    Narrative:     Performed at:  [facility] [address], [facility], [facility] [employer]   Phone [phone]   HCG, QUANTITATIVE, PREGNANCY    Narrative:     Performed at:  [facility] [address], [facility], [facility] [employer]   Phone [phone]   ABO/RH    Narrative:     Performed at:  [facility] [address], [facility], [facility] [employer]   Phone [phone]       All other labs were within normal range or not returned as of this dictation. EKG: All EKG's are interpreted by the Emergency Department Physician in the absence of a cardiologist.  Please see their note for interpretation of EKG. RADIOLOGY:   Non-plain film images such as CT, Ultrasound and MRI are read by the radiologist. Plain radiographic images are visualized and preliminarily interpreted by the ED Provider with the below findings:        Interpretation per the Radiologist below, if available at the time of this note:    US OB TRANSVAGINAL   Final Result   [place]yer intrauterine pregnancy with normal fetal heart motion,   corresponding to 12 weeks 2 days. No results found. PROCEDURES   Unless otherwise noted below, none     Procedures    CRITICAL CARE TIME   N/A    CONSULTS:  None      EMERGENCY DEPARTMENT COURSE and DIFFERENTIAL DIAGNOSIS/MDM:   Vitals:    Vitals:    01/30/21 1328 01/30/21 1330 01/30/21 1400   BP: 126/75 116/71 116/71   Pulse: 94     Resp: 18     Temp: 98.4 °F (36.9 °C)     TempSrc: Oral     SpO2: 98%     Weight: 230 lb (104.3 kg)     Height: 5' 5\" (1.651 m)         Patient was given the following medications:  Medications   acetaminophen (TYLENOL) tablet 1,000 mg (1,000 mg Oral Given 1/30/21 1307)           Patient is a 59-year-old female who presents to the ED with complaint of abdominal pain. Abdominal pain in pregnancy. Patient about 12 weeks pregnant. No vaginal bleeding or discharge. No urinary symptoms or changes in bowel movements. CBC showed normal white count and platelets. Hemoglobin 11.7. CMP unremarkable. Lipase normal.  Blood type A+. hCG quant 22,000. Urinalysis unremarkable. Ultrasound obtained and showed single intrauterine pregnancy with normal fetal heart motion comport spotting to 12 weeks and 2 days. Given Tylenol here in the ED for symptom control. Given history and physical examination suffering from abdominal pain and pregnancy at this time. Reassuring work-up here in the ED and believe can be safely discharged home with close outpatient follow-up by OB/GYN. Return the ED for any worsening symptoms. Low suspicion for active miscarriage, TOA, PID, ovarian torsion, ectopic pregnancy, help syndrome, preeclampsia, eclampsia, Rh incompatibility, pyelonephritis, nephrolithiasis, obstruction, peritonitis, perforation, pancreatitis, cholecystitis, appendicitis, diverticulitis, colitis, mesenteric edema, AAA, dissection or other emergent etiology at this time. FINAL IMPRESSION      1.  Abdominal pain during pregnancy in second trimester          DISPOSITION/PLAN   DISPOSITION Decision To Discharge 01/30/2021 02:38:55 PM      PATIENT REFERREDTO:  your gynecologist          [facility] Emergency Department  [place] [address]  [phone]  Go to   If symptoms worsen      DISCHARGE MEDICATIONS:  Discharge Medication List as of 1/30/2021  2:52 PM          DISCONTINUED MEDICATIONS:  Discharge Medication List as of 1/30/2021  2:52 PM      STOP taking these medications       polyethylene glycol (MIRALAX) 17 GM/SCOOP powder Comments:   Reason for Stopping:                      (Please note that portions of this note were completed with a voice recognition program.  Efforts were made to edit the dictations but occasionally words are mis-transcribed.)    SEVERO Jeffery (electronically signed)          SEVERO Bellamy  01/30/21 3502

## 2021-01-30 NOTE — ED NOTES
Reviewed discharge instructions with patient, verbalized understanding, ambulatory at time of discharge.         Shari Cheung RN  01/30/21 9157

## 2023-03-18 ENCOUNTER — HOSPITAL ENCOUNTER (EMERGENCY)
Age: 28
Discharge: HOME | End: 2023-03-18
Payer: COMMERCIAL

## 2023-03-18 VITALS
BODY MASS INDEX: 38.97 KG/M2 | HEART RATE: 83 BPM | OXYGEN SATURATION: 100 % | WEIGHT: 234.2 LBS | TEMPERATURE: 98.4 F | DIASTOLIC BLOOD PRESSURE: 103 MMHG | SYSTOLIC BLOOD PRESSURE: 161 MMHG | RESPIRATION RATE: 18 BRPM

## 2023-03-18 DIAGNOSIS — R11.0 NAUSEA: ICD-10-CM

## 2023-03-18 DIAGNOSIS — B37.9 YEAST INFECTION: Primary | ICD-10-CM

## 2023-03-18 LAB
ALBUMIN SERPL-MCNC: 4.2 G/DL (ref 3.4–5)
ALBUMIN/GLOB SERPL: 1 {RATIO} (ref 1.1–2.2)
ALP SERPL-CCNC: 81 U/L (ref 40–129)
ALT SERPL-CCNC: 7 U/L (ref 10–40)
ANION GAP SERPL CALCULATED.3IONS-SCNC: 10 MMOL/L (ref 3–16)
AST SERPL-CCNC: 12 U/L (ref 15–37)
B-HCG SERPL EIA 3RD IS-ACNC: <5 MIU/ML
BACTERIA GENITAL QL WET PREP: ABNORMAL
BACTERIA URNS QL MICRO: ABNORMAL /HPF
BASOPHILS # BLD: 0 K/UL (ref 0–0.2)
BASOPHILS NFR BLD: 0.4 %
BILIRUB SERPL-MCNC: 0.3 MG/DL (ref 0–1)
BILIRUB UR QL STRIP.AUTO: NEGATIVE
BUN SERPL-MCNC: 14 MG/DL (ref 7–20)
CALCIUM SERPL-MCNC: 9 MG/DL (ref 8.3–10.6)
CHLORIDE SERPL-SCNC: 104 MMOL/L (ref 99–110)
CLARITY UR: CLEAR
CLUE CELLS SPEC QL WET PREP: ABNORMAL
CO2 SERPL-SCNC: 23 MMOL/L (ref 21–32)
COLOR UR: YELLOW
CREAT SERPL-MCNC: 0.7 MG/DL (ref 0.6–1.1)
DEPRECATED RDW RBC AUTO: 16.2 % (ref 12.4–15.4)
EOSINOPHIL # BLD: 0.1 K/UL (ref 0–0.6)
EOSINOPHIL NFR BLD: 1.3 %
EPI CELLS #/AREA URNS AUTO: 3 /HPF (ref 0–5)
EPI CELLS SPEC QL WET PREP: ABNORMAL
FLUAV RNA UPPER RESP QL NAA+PROBE: NEGATIVE
FLUBV AG NPH QL: NEGATIVE
GFR SERPLBLD CREATININE-BSD FMLA CKD-EPI: >60 ML/MIN/{1.73_M2}
GLUCOSE SERPL-MCNC: 94 MG/DL (ref 70–99)
GLUCOSE UR STRIP.AUTO-MCNC: NEGATIVE MG/DL
HCT VFR BLD AUTO: 31.3 % (ref 36–48)
HGB BLD-MCNC: 9.8 G/DL (ref 12–16)
HGB UR QL STRIP.AUTO: NEGATIVE
HYALINE CASTS #/AREA URNS AUTO: 0 /LPF (ref 0–8)
KETONES UR STRIP.AUTO-MCNC: NEGATIVE MG/DL
LEUKOCYTE ESTERASE UR QL STRIP.AUTO: ABNORMAL
LYMPHOCYTES # BLD: 3.5 K/UL (ref 1–5.1)
LYMPHOCYTES NFR BLD: 36.8 %
MCH RBC QN AUTO: 23.3 PG (ref 26–34)
MCHC RBC AUTO-ENTMCNC: 31.2 G/DL (ref 31–36)
MCV RBC AUTO: 74.6 FL (ref 80–100)
MONOCYTES # BLD: 0.8 K/UL (ref 0–1.3)
MONOCYTES NFR BLD: 7.9 %
MUCUS: PRESENT
NEUTROPHILS # BLD: 5.1 K/UL (ref 1.7–7.7)
NEUTROPHILS NFR BLD: 53.6 %
NITRITE UR QL STRIP.AUTO: NEGATIVE
PH UR STRIP.AUTO: 6 [PH] (ref 5–8)
PLATELET # BLD AUTO: 445 K/UL (ref 135–450)
PMV BLD AUTO: 8.2 FL (ref 5–10.5)
POTASSIUM SERPL-SCNC: 4.4 MMOL/L (ref 3.5–5.1)
PROT SERPL-MCNC: 8.5 G/DL (ref 6.4–8.2)
PROT UR STRIP.AUTO-MCNC: NEGATIVE MG/DL
RBC # BLD AUTO: 4.19 M/UL (ref 4–5.2)
RBC CLUMPS #/AREA URNS AUTO: 0 /HPF (ref 0–4)
RBC SPEC QL WET PREP: ABNORMAL
SARS-COV-2 RDRP RESP QL NAA+PROBE: NOT DETECTED
SODIUM SERPL-SCNC: 137 MMOL/L (ref 136–145)
SP GR UR STRIP.AUTO: 1.02 (ref 1–1.03)
SPECIMEN SOURCE FLD: ABNORMAL
T VAGINALIS GENITAL QL WET PREP: ABNORMAL
UA COMPLETE W REFLEX CULTURE PNL UR: ABNORMAL
UA DIPSTICK W REFLEX MICRO PNL UR: YES
URN SPEC COLLECT METH UR: ABNORMAL
UROBILINOGEN UR STRIP-ACNC: 0.2 E.U./DL
WBC # BLD AUTO: 9.6 K/UL (ref 4–11)
WBC #/AREA URNS AUTO: 1 /HPF (ref 0–5)
WBC SPEC QL WET PREP: ABNORMAL
YEAST GENITAL QL WET PREP: ABNORMAL

## 2023-03-18 PROCEDURE — 87804 INFLUENZA ASSAY W/OPTIC: CPT

## 2023-03-18 PROCEDURE — 87635 SARS-COV-2 COVID-19 AMP PRB: CPT

## 2023-03-18 PROCEDURE — 99283 EMERGENCY DEPT VISIT LOW MDM: CPT

## 2023-03-18 PROCEDURE — 87210 SMEAR WET MOUNT SALINE/INK: CPT

## 2023-03-18 PROCEDURE — 85025 COMPLETE CBC W/AUTO DIFF WBC: CPT

## 2023-03-18 PROCEDURE — 80053 COMPREHEN METABOLIC PANEL: CPT

## 2023-03-18 PROCEDURE — 6370000000 HC RX 637 (ALT 250 FOR IP): Performed by: PHYSICIAN ASSISTANT

## 2023-03-18 PROCEDURE — 84702 CHORIONIC GONADOTROPIN TEST: CPT

## 2023-03-18 PROCEDURE — 81001 URINALYSIS AUTO W/SCOPE: CPT

## 2023-03-18 RX ORDER — GABAPENTIN 300 MG/1
300 CAPSULE ORAL 2 TIMES DAILY
COMMUNITY

## 2023-03-18 RX ORDER — METOPROLOL SUCCINATE 100 MG/1
100 TABLET, EXTENDED RELEASE ORAL 2 TIMES DAILY
COMMUNITY

## 2023-03-18 RX ORDER — ONDANSETRON 4 MG/1
4 TABLET, FILM COATED ORAL EVERY 8 HOURS PRN
Qty: 20 TABLET | Refills: 0 | Status: SHIPPED | OUTPATIENT
Start: 2023-03-18

## 2023-03-18 RX ORDER — NIFEDIPINE 20 MG/1
20 CAPSULE ORAL DAILY
COMMUNITY

## 2023-03-18 RX ORDER — FLUCONAZOLE 100 MG/1
200 TABLET ORAL ONCE
Status: DISCONTINUED | OUTPATIENT
Start: 2023-03-18 | End: 2023-03-19 | Stop reason: HOSPADM

## 2023-03-18 RX ORDER — ACETAMINOPHEN 500 MG
1000 TABLET ORAL ONCE
Status: COMPLETED | OUTPATIENT
Start: 2023-03-18 | End: 2023-03-18

## 2023-03-18 RX ORDER — FLUCONAZOLE 150 MG/1
150 TABLET ORAL ONCE
Qty: 1 TABLET | Refills: 0 | Status: SHIPPED | OUTPATIENT
Start: 2023-03-18 | End: 2023-03-18

## 2023-03-18 RX ADMIN — ACETAMINOPHEN 1000 MG: 500 TABLET ORAL at 20:10

## 2023-03-18 ASSESSMENT — ENCOUNTER SYMPTOMS
VOMITING: 1
DIARRHEA: 0
SHORTNESS OF BREATH: 0
RHINORRHEA: 0
COUGH: 0
NAUSEA: 1
ABDOMINAL PAIN: 1

## 2023-03-18 ASSESSMENT — PAIN - FUNCTIONAL ASSESSMENT: PAIN_FUNCTIONAL_ASSESSMENT: 0-10

## 2023-03-18 ASSESSMENT — PAIN SCALES - GENERAL: PAINLEVEL_OUTOF10: 5

## 2023-03-19 NOTE — ED PROVIDER NOTES
[facility] EMERGENCY DEPARTMENT  EMERGENCY DEPARTMENT ENCOUNTER        Pt Name: Kasia Watson  MRN: [id number]  Birthdate 1995  Date of evaluation: 3/18/2023  Provider: Carin Celis PA-C  PCP: Mary Rivera MD  Note Started: 8:07 PM EDT 3/18/23      BEVERLY. I have evaluated this patient.  My supervising physician was available for consultation.      CHIEF COMPLAINT       Chief Complaint   Patient presents with    Pregnancy Test     Pt. Wondering if she is pregnant.  Severe headaches, cramps, gagging, cant keep food down, back pain, irritation in cecilia area, tired,vomiting and does not feel self. Has taking several pregnancy test and some are positive and some negative. LMP-2/27/2023..        HISTORY OF PRESENT ILLNESS: 1 or more Elements     History From: Patient  Limitations to history : None    Kasia Watson is a 27 y.o. female who presents to the emergency department with complaints of vaginal irritation over the last 2 days.  She does endorse that she had her IUD removed 1 week ago due to it being in the wrong place.  She does endorse that she has been sexually active with 1 partner and denies concern for STDs.  She has no vaginal discharge or bleeding.  Patient states she has taken home pregnancy test and has had both negative and positive results.  Over the last couple weeks she has noticed fatigue, low-grade fevers, emesis, and lower abdominal cramping.  She does endorse that her last menstrual cycle was February 27.  She has a history of miscarriages and chemical pregnancies, and is concerned that she may be pregnant.  The patient states that she has been on multiple sick contacts, she denied any cough or congestion.  Patient denies any dysuria, or hematuria.    Nursing Notes were all reviewed and agreed with or any disagreements were addressed in the HPI.    REVIEW OF SYSTEMS :      Review of Systems   Constitutional:  Positive for fatigue and fever. Negative for activity change,  appetite change and chills. HENT:  Negative for congestion and rhinorrhea. Respiratory:  Negative for cough and shortness of breath. Cardiovascular:  Negative for chest pain. Gastrointestinal:  Positive for abdominal pain, nausea and vomiting. Negative for diarrhea. Genitourinary:  Positive for vaginal pain. Negative for difficulty urinating, dysuria and hematuria. Positives and Pertinent negatives as per HPI. SURGICAL HISTORY     Past Surgical History:   Procedure Laterality Date    KNEE SURGERY Right        CURRENTMEDICATIONS       Discharge Medication List as of 3/18/2023 10:07 PM        CONTINUE these medications which have NOT CHANGED    Details   metoprolol succinate (TOPROL XL) 100 MG extended release tablet Take 100 mg by mouth 2 times dailyHistorical Med      gabapentin (NEURONTIN) 300 MG capsule Take 300 mg by mouth 2 times daily. Historical Med      NIFEdipine (PROCARDIA) 20 MG capsule Take 20 mg by mouth dailyHistorical Med      valACYclovir (VALTREX) 500 MG tablet Take 500 mg by mouth 2 times dailyHistorical Med      albuterol (ACCUNEB) 0.63 MG/3ML nebulizer solution Take 1 ampule by nebulization every 6 hours as needed for WheezingHistorical Med      Prenatal Vit-DSS-Fe Cbn-FA (PRENATAL AD) TABS Take 1 tablet by mouth daily, Disp-30 tablet, R-0Print             ALLERGIES     Shellfish-derived products    FAMILYHISTORY       Family History   Problem Relation Age of Onset    High Cholesterol Mother     Diabetes Maternal Grandmother         SOCIAL HISTORY       Social History     Tobacco Use    Smoking status: Never    Smokeless tobacco: Never   Vaping Use    Vaping Use: Never used   Substance Use Topics    Alcohol use: No    Drug use: No       SCREENINGS        [place] Coma Scale  Eye Opening: Spontaneous  Best Verbal Response: Oriented  Best Motor Response: Obeys commands  [place] Coma Scale Score: 15                CIWA Assessment  BP: (!) 161/103  Heart Rate: 83           PHYSICAL EXAM  1 or more Elements     ED Triage Vitals [03/18/23 1958]   BP Temp Temp Source Heart Rate Resp SpO2 Height Weight   (!) 161/103 98.4 °F (36.9 °C) Oral 83 18 100 % -- 234 lb 3.2 oz (106.2 kg)       Physical Exam  Vitals and nursing note reviewed. Constitutional:       Appearance: She is well-developed. She is not diaphoretic. HENT:      Head: Normocephalic and atraumatic. Right Ear: External ear normal.      Left Ear: External ear normal.      Nose: Nose normal.   Eyes:      General:         Right eye: No discharge. Left eye: No discharge. Neck:      Trachea: No tracheal deviation. Cardiovascular:      Rate and Rhythm: Normal rate and regular rhythm. Pulses: Normal pulses. Heart sounds: Normal heart sounds. Pulmonary:      Effort: Pulmonary effort is normal. No respiratory distress. Breath sounds: Normal breath sounds. No wheezing or rales. Abdominal:      General: Abdomen is flat. Bowel sounds are normal. There is no distension. Palpations: Abdomen is soft. Tenderness: There is no abdominal tenderness. There is no right CVA tenderness, left CVA tenderness or guarding. Musculoskeletal:         General: Normal range of motion. Cervical back: Normal range of motion and neck supple. Skin:     General: Skin is warm and dry. Neurological:      Mental Status: She is alert and oriented to person, place, and time.    Psychiatric:         Behavior: Behavior normal.             DIAGNOSTIC RESULTS   LABS:    Labs Reviewed   WET PREP, GENITAL - Abnormal; Notable for the following components:       Result Value    Yeast, Wet Prep 1+ (*)     All other components within normal limits   URINALYSIS WITH REFLEX TO CULTURE - Abnormal; Notable for the following components:    Leukocyte Esterase, Urine SMALL (*)     All other components within normal limits   CBC WITH AUTO DIFFERENTIAL - Abnormal; Notable for the following components:    Hemoglobin 9.8 (*)     Hematocrit 31.3 (*)     MCV 74.6 (*)     MCH 23.3 (*)     RDW 16.2 (*)     All other components within normal limits   COMPREHENSIVE METABOLIC PANEL W/ REFLEX TO MG FOR LOW K - Abnormal; Notable for the following components: Total Protein 8.5 (*)     Albumin/Globulin Ratio 1.0 (*)     ALT 7 (*)     AST 12 (*)     All other components within normal limits   MICROSCOPIC URINALYSIS - Abnormal; Notable for the following components:    Mucus, UA Present (*)     All other components within normal limits   COVID-19, RAPID   RAPID INFLUENZA A/B ANTIGENS   HCG, QUANTITATIVE, PREGNANCY       When ordered only abnormal lab results are displayed. All other labs were within normal range or not returned as of this dictation. EKG: When ordered, EKG's are interpreted by the Emergency Department Physician in the absence of a cardiologist.  Please see their note for interpretation of EKG. RADIOLOGY:   Non-plain film images such as CT, Ultrasound and MRI are read by the radiologist. Plain radiographic images are visualized and preliminarily interpreted by the ED Provider with the below findings:          Interpretation per the Radiologist below, if available at the time of this note:    No orders to display     No results found. No results found. PROCEDURES   Unless otherwise noted below, none     Procedures    CRITICAL CARE TIME (.cctime)       PAST MEDICAL HISTORY      has a past medical history of Asthma, Herpes, Hypertension, Irregular heart beat, and Miscarriage.      EMERGENCY DEPARTMENT COURSE and DIFFERENTIAL DIAGNOSIS/MDM:   Vitals:    Vitals:    03/18/23 1958   BP: (!) 161/103   Pulse: 83   Resp: 18   Temp: 98.4 °F (36.9 °C)   TempSrc: Oral   SpO2: 100%   Weight: 234 lb 3.2 oz (106.2 kg)       Patient was given the following medications:  Medications   fluconazole (DIFLUCAN) tablet 200 mg (has no administration in time range)   acetaminophen (TYLENOL) tablet 1,000 mg (1,000 mg Oral Given 3/18/23 2010)             Is this patient to be included in the SEP-1 Core Measure due to severe sepsis or septic shock? No   Exclusion criteria - the patient is NOT to be included for SEP-1 Core Measure due to: Infection is not suspected    Chronic Conditions affecting care:  has a past medical history of Asthma, Herpes, Hypertension, Irregular heart beat, and Miscarriage. CONSULTS: (Who and What was discussed)  None    Social Determinants Significantly Affecting Health : None    Records Reviewed (External and Source) previous internal medicine records from outside facility    CC/HPI Summary, DDx, ED Course, and Reassessment: Briefly, this is a 49-year-old female who presents emergency department today for evaluation for vaginal irritation, and concerns of pregnancy. Patient states that she had her IUD removed, and she states that she did have a normal menstrual cycle. She states that she has noticed an stronger sense of smell, and she states that she has also had some nausea, chills and generally not feeling well. Physical exam, abdomen is benign, vital signs are stable. Disposition Considerations (tests considered but not done, Admit vs D/C, Shared Decision Making, Pt Expectation of Test or Tx.):   Urine shows no evidence of infection or blood. CBC shows a hemoglobin of 9.8, however she was recently on her menstrual cycle. CMP is unremarkable. Kayleigh Bainbridge is negative. imaging however her work-up is unremarkable, and her abdomen is benign and this is otherwise not clinically indicated. She is to obtain follow-up with her primary care physician within 2 to 3 days. She is to return to the ED for any new or worsening symptoms, the patient voiced understanding is agreeable with plan. Stable for discharge.   Results for orders placed or performed during the hospital encounter of 03/18/23   COVID-19, Rapid    Specimen: Nasopharyngeal Swab   Result Value Ref Range    SARS-CoV-2, NAAT Not Detected Not Detected   Rapid influenza A/B antigens Specimen: Nasopharyngeal   Result Value Ref Range    Rapid Influenza A Ag Negative Negative    Rapid Influenza B Ag Negative Negative   Wet prep, genital    Specimen: Vaginal   Result Value Ref Range    Trichomonas Prep None Seen     Yeast, Wet Prep 1+ (A)     Clue Cells, Wet Prep None Seen     WBC, Wet Prep 2+     RBC, Wet Prep None Seen     Epi Cells 3+     Bacteria 3+     Source Wet Prep Vaginal    Urinalysis with Reflex to Culture    Specimen: Urine   Result Value Ref Range    Color, UA Yellow Straw/Yellow    Clarity, UA Clear Clear    Glucose, Ur Negative Negative mg/dL    Bilirubin Urine Negative Negative    Ketones, Urine Negative Negative mg/dL    Specific Gravity, UA 1.020 1.005 - 1.030    Blood, Urine Negative Negative    pH, UA 6.0 5.0 - 8.0    Protein, UA Negative Negative mg/dL    Urobilinogen, Urine 0.2 <2.0 E.U./dL    Nitrite, Urine Negative Negative    Leukocyte Esterase, Urine SMALL (A) Negative    Microscopic Examination YES     Urine Type NotGiven     Urine Reflex to Culture Not Indicated    CBC with Auto Differential   Result Value Ref Range    WBC 9.6 4.0 - 11.0 K/uL    RBC 4.19 4.00 - 5.20 M/uL    Hemoglobin 9.8 (L) 12.0 - 16.0 g/dL    Hematocrit 31.3 (L) 36.0 - 48.0 %    MCV 74.6 (L) 80.0 - 100.0 fL    MCH 23.3 (L) 26.0 - 34.0 pg    MCHC 31.2 31.0 - 36.0 g/dL    RDW 16.2 (H) 12.4 - 15.4 %    Platelets 130 099 - 619 K/uL    MPV 8.2 5.0 - 10.5 fL    Neutrophils % 53.6 %    Lymphocytes % 36.8 %    Monocytes % 7.9 %    Eosinophils % 1.3 %    Basophils % 0.4 %    Neutrophils Absolute 5.1 1.7 - 7.7 K/uL    Lymphocytes Absolute 3.5 1.0 - 5.1 K/uL    Monocytes Absolute 0.8 0.0 - 1.3 K/uL    Eosinophils Absolute 0.1 0.0 - 0.6 K/uL    Basophils Absolute 0.0 0.0 - 0.2 K/uL   Comprehensive Metabolic Panel w/ Reflex to MG   Result Value Ref Range    Sodium 137 136 - 145 mmol/L    Potassium reflex Magnesium 4.4 3.5 - 5.1 mmol/L    Chloride 104 99 - 110 mmol/L    CO2 23 21 - 32 mmol/L    Anion Gap 10 3 - 16    Glucose 94 70 - 99 mg/dL    BUN 14 7 - 20 mg/dL    Creatinine 0.7 0.6 - 1.1 mg/dL    Est, Glom Filt Rate >60 >60    Calcium 9.0 8.3 - 10.6 mg/dL    Total Protein 8.5 (H) 6.4 - 8.2 g/dL    Albumin 4.2 3.4 - 5.0 g/dL    Albumin/Globulin Ratio 1.0 (L) 1.1 - 2.2    Total Bilirubin 0.3 0.0 - 1.0 mg/dL    Alkaline Phosphatase 81 40 - 129 U/L    ALT 7 (L) 10 - 40 U/L    AST 12 (L) 15 - 37 U/L   HCG, Quantitative, Pregnancy   Result Value Ref Range    hCG Quant <5.0 <5.0 mIU/mL   Microscopic Urinalysis   Result Value Ref Range    Bacteria, UA None Seen None Seen /HPF    Hyaline Casts, UA 0 0 - 8 /LPF    WBC, UA 1 0 - 5 /HPF    RBC, UA 0 0 - 4 /HPF    Epithelial Cells, UA 3 0 - 5 /HPF    Mucus, UA Present (A) None Seen         I estimate there is LOW risk acute appendicitis, acute cholecystitis, cholangitis, pancreatitis, diverticulitis, perforated viscus, perforated ulcer, colitis, C. diff colitis, ischemic colitis, bowel obstruction, acute dissection, thus I consider the discharge disposition reasonable. Also, there is no evidence or peritonitis, sepsis, or toxicity. Zac Villanueva and I have discussed the diagnosis and risks, and we agree with discharging home to follow-up with their primary doctor. We also discussed returning to the Emergency Department immediately if new or worsening symptoms occur. We have discussed the symptoms which are most concerning (e.g., bloody stool, fever, changing or worsening pain, vomiting) that necessitate immediate return. I am the Primary Clinician of Record. FINAL IMPRESSION      1. Yeast infection    2.  Nausea          DISPOSITION/PLAN     DISPOSITION Decision To Discharge 03/18/2023 10:10:34 PM      PATIENT REFERRED TO:  Lalitha Keller MD  [address] [place] [id number] [phone]    Schedule an appointment as soon as possible for a visit in 2 days      [facility]  [place] [address]  [phone]    As needed, If symptoms worsen    DISCHARGE MEDICATIONS:  Discharge Medication List as of 3/18/2023 10:07 PM        START taking these medications    Details   ondansetron (ZOFRAN) 4 MG tablet Take 1 tablet by mouth every 8 hours as needed for Nausea, Disp-20 tablet, R-0Normal      fluconazole (DIFLUCAN) 150 MG tablet Take 1 tablet by mouth once for 1 dose, Disp-1 tablet, R-0Normal             DISCONTINUED MEDICATIONS:  Discharge Medication List as of 3/18/2023 10:07 PM                 (Please note that portions of this note were completed with a voice recognition program.  Efforts were made to edit the dictations but occasionally words are mis-transcribed.)    Sher Riddle PA-C (electronically signed)        Sher Riddle PA-C  03/18/23 [id number]

## 2023-04-22 ENCOUNTER — HOSPITAL ENCOUNTER (EMERGENCY)
Age: 28
Discharge: HOME | End: 2023-04-22
Payer: COMMERCIAL

## 2023-04-22 VITALS
BODY MASS INDEX: 38.02 KG/M2 | TEMPERATURE: 98.9 F | WEIGHT: 228.5 LBS | SYSTOLIC BLOOD PRESSURE: 117 MMHG | OXYGEN SATURATION: 99 % | HEART RATE: 89 BPM | RESPIRATION RATE: 20 BRPM | DIASTOLIC BLOOD PRESSURE: 79 MMHG

## 2023-04-22 DIAGNOSIS — Z32.02 NEGATIVE PREGNANCY TEST: Primary | ICD-10-CM

## 2023-04-22 LAB
BILIRUB UR QL STRIP.AUTO: NEGATIVE
CLARITY UR: CLEAR
COLOR UR: YELLOW
GLUCOSE UR STRIP.AUTO-MCNC: NEGATIVE MG/DL
HCG UR QL: NEGATIVE
HGB UR QL STRIP.AUTO: NEGATIVE
KETONES UR STRIP.AUTO-MCNC: NEGATIVE MG/DL
LEUKOCYTE ESTERASE UR QL STRIP.AUTO: NEGATIVE
NITRITE UR QL STRIP.AUTO: NEGATIVE
PH UR STRIP.AUTO: 7 [PH] (ref 5–8)
PROT UR STRIP.AUTO-MCNC: NEGATIVE MG/DL
SP GR UR STRIP.AUTO: 1.01 (ref 1–1.03)
UA COMPLETE W REFLEX CULTURE PNL UR: NORMAL
UA DIPSTICK W REFLEX MICRO PNL UR: NORMAL
URN SPEC COLLECT METH UR: NORMAL
UROBILINOGEN UR STRIP-ACNC: 0.2 E.U./DL

## 2023-04-22 PROCEDURE — 81003 URINALYSIS AUTO W/O SCOPE: CPT

## 2023-04-22 PROCEDURE — 84703 CHORIONIC GONADOTROPIN ASSAY: CPT

## 2023-04-22 PROCEDURE — 99283 EMERGENCY DEPT VISIT LOW MDM: CPT

## 2023-04-22 ASSESSMENT — PAIN - FUNCTIONAL ASSESSMENT: PAIN_FUNCTIONAL_ASSESSMENT: 0-10

## 2023-04-22 ASSESSMENT — PAIN SCALES - GENERAL: PAINLEVEL_OUTOF10: 6

## 2023-04-22 ASSESSMENT — ENCOUNTER SYMPTOMS
VOMITING: 0
ABDOMINAL PAIN: 0
CONSTIPATION: 0
BACK PAIN: 0
NAUSEA: 0
DIARRHEA: 0
COLOR CHANGE: 0
SHORTNESS OF BREATH: 0

## 2023-04-22 NOTE — ED PROVIDER NOTES
MEDICATIONS:  Discharge Medication List as of 4/22/2023  5:19 PM                 (Please note that portions of this note were completed with a voice recognition program.  Efforts were made to edit the dictations but occasionally words are mis-transcribed.)    Cruz Leger PA-C (electronically signed)          Cruz Leger PA-C  04/22/23 [id number]

## 2025-01-14 ENCOUNTER — TELEPHONE (OUTPATIENT)
Dept: BARIATRICS/WEIGHT MGMT | Age: 30
End: 2025-01-14

## 2025-01-15 ENCOUNTER — OFFICE VISIT (OUTPATIENT)
Dept: BARIATRICS/WEIGHT MGMT | Age: 30
End: 2025-01-15

## 2025-01-15 VITALS
OXYGEN SATURATION: 97 % | HEART RATE: 94 BPM | DIASTOLIC BLOOD PRESSURE: 89 MMHG | RESPIRATION RATE: 18 BRPM | SYSTOLIC BLOOD PRESSURE: 139 MMHG | BODY MASS INDEX: 40.89 KG/M2 | WEIGHT: 245.4 LBS | HEIGHT: 65 IN

## 2025-01-15 DIAGNOSIS — I10 ESSENTIAL HYPERTENSION: ICD-10-CM

## 2025-01-15 DIAGNOSIS — E66.01 MORBID OBESITY WITH BMI OF 40.0-44.9, ADULT: Primary | ICD-10-CM

## 2025-01-15 RX ORDER — AMLODIPINE BESYLATE 5 MG/1
5 TABLET ORAL DAILY
COMMUNITY

## 2025-01-15 RX ORDER — QUETIAPINE FUMARATE 25 MG/1
25 TABLET, FILM COATED ORAL 2 TIMES DAILY
COMMUNITY

## 2025-01-15 NOTE — PROGRESS NOTES
Kasia Watson is a 29 y.o. female with a date of birth of 1995.    Vitals:    01/15/25 0822   Pulse: 94   Resp: 18   SpO2: 97%   Weight: 111.3 kg (245 lb 6.4 oz)   Height: 1.651 m (5' 5\")    BMI: Body mass index is 40.84 kg/m². Obesity Classification: Class III    Weight History:   Wt Readings from Last 3 Encounters:   01/15/25 111.3 kg (245 lb 6.4 oz)   04/22/23 103.6 kg (228 lb 8 oz)   03/18/23 106.2 kg (234 lb 3.2 oz)       Pt attended Medical Weight Management Seminar. Patient was educated on low-carb diet protocol. Nutrition and habit guidelines were discussed and written information was provided. Bariatric Nutrition Questionnaire completed during class and scanned into media.       Goals  Weight: 170#  Health Improvement: getting off medications, able to not feel winded, building confidence, able to wear the clothes she wants to wear    Assessment  Nutritional Needs: RMR=(9.99 x 111.3) + (6.25 x 165.1) - (4.92 x 29 y.o.) -161  = 1840 kcal x 1.3 (sedentary activity factor)= 2392 kcal - 1000 (for 2 lb weight loss/week)= 1392 kcal.    Patient has participated in the following weight loss programs: None.   Patient has not participated in meal replacement/liquid diets.  Patient has not participated in weight loss medications.  Patient does not have history of bariatric surgery.     Pt reports allergy to shellfish. Pt reports she is exercising. Pt reports she is a fast eater, eats until overly full or stuffed, eats out of stress/emotions/boredom and eats in the middle of the night.     Plan  Plan/Recommendations:  Start [id number] josephine LC MP  Optifast:  Pt not interested in  Diet Medications:  Pt interested in  Bariatric Surgery:  Pt interested in  1:1 RD Visit:  Pt interested in    PES Statement: Overweight/Obesity related to lack of exercise, sedentary lifestyle, unhealthy eating habits, and unsuccessful diet attempts as evidenced by BMI. Body mass index is 40.84 kg/m².    Handouts: new patient packet    Will

## 2025-01-21 ENCOUNTER — TELEMEDICINE (OUTPATIENT)
Dept: BARIATRICS/WEIGHT MGMT | Age: 30
End: 2025-01-21

## 2025-01-21 DIAGNOSIS — E66.01 MORBID OBESITY WITH BMI OF 40.0-44.9, ADULT: Primary | ICD-10-CM

## 2025-01-21 DIAGNOSIS — Z71.3 DIETARY COUNSELING AND SURVEILLANCE: ICD-10-CM

## 2025-01-21 ASSESSMENT — ENCOUNTER SYMPTOMS
PHOTOPHOBIA: 0
CHEST TIGHTNESS: 0
BLOOD IN STOOL: 0
DIARRHEA: 0
SHORTNESS OF BREATH: 0
CHOKING: 0
VOMITING: 0
NAUSEA: 0
CONSTIPATION: 0
WHEEZING: 0
APNEA: 0
ABDOMINAL PAIN: 0
EYE PAIN: 0
COUGH: 0
ABDOMINAL DISTENTION: 0

## 2025-01-21 NOTE — PROGRESS NOTES
Patient: Kasia Watson     Encounter Date: 1/21/2025    YOB: 1995               Age: 29 y.o.        Patient identification was verified at the start of the visit.         1/21/2025     4:32 PM   Patient-Reported Vitals   Patient-Reported Weight 241   Patient-Reported Height 5'5   Patient-Reported Systolic 160 mmHg   Patient-Reported Diastolic 78 mmHg   Patient-Reported Pulse 150   Patient-Reported Temperature 98.6         BP Readings from Last 1 Encounters:   01/15/25 139/89       BMI Readings from Last 1 Encounters:   01/15/25 40.84 kg/m²       Pulse Readings from Last 1 Encounters:   01/15/25 94                                             Wt Readings from Last 3 Encounters:   01/15/25 111.3 kg (245 lb 6.4 oz)   04/22/23 103.6 kg (228 lb 8 oz)   03/18/23 106.2 kg (234 lb 3.2 oz)        Chief Complaint   Patient presents with    Bariatric, Initial Visit     MWM- NP        HPI:    29 y.o. female presents to establish care via video visit. The patient's medical history is significant for class III obesity. The patient has a long-standing history of obesity which started gradually. The problem is severe.  The patient has been gaining weight.  Risk factors include annual weight gain of >2 lbs (1 kg)/ year and sedentary lifestyle. Aggravating factors include poor diet and lack of physical activity. The patient has tried various diet/exercise plans which have been ineffective in the long-run. she is motivated to start losing weight to help improve her overall health.     Daughter born in May 2024--not currently breastfeeding     When did you become overweight?  [] Childhood   [] Teens   [] Adulthood   [x] Pregnancy   [] Menopause    Highest adult weight: Current     Triggers for weight gain?   [x] Stress   [] Illness   [] Medications   [] Travel  []Injury     [] Nightshift work   [] Insomnia  [] No specific triggers   [x] Other: pregnancy    Food triggers:   [x] Stress   [x] Boredom   [] Fast food   []

## 2025-01-22 ENCOUNTER — TELEPHONE (OUTPATIENT)
Dept: BARIATRICS/WEIGHT MGMT | Age: 30
End: 2025-01-22

## 2025-01-22 NOTE — TELEPHONE ENCOUNTER
Spoke with patient. Advised to contact the office to schedule a follow-up appointment with Dr. Mtz once fasting labs have been completed. Patient verbalized understanding.

## 2025-01-28 ENCOUNTER — HOSPITAL ENCOUNTER (OUTPATIENT)
Age: 30
Discharge: HOME | End: 2025-01-28
Payer: COMMERCIAL

## 2025-01-28 DIAGNOSIS — E66.01 MORBID OBESITY WITH BMI OF 40.0-44.9, ADULT: ICD-10-CM

## 2025-01-28 LAB
25(OH)D3 SERPL-MCNC: 21.3 NG/ML
ALBUMIN SERPL-MCNC: 4.2 G/DL (ref 3.4–5)
ALBUMIN/GLOB SERPL: 1.1 {RATIO} (ref 1.1–2.2)
ALP SERPL-CCNC: 64 U/L (ref 40–129)
ALT SERPL-CCNC: 18 U/L (ref 10–40)
ANION GAP SERPL CALCULATED.3IONS-SCNC: 10 MMOL/L (ref 3–16)
AST SERPL-CCNC: 15 U/L (ref 15–37)
BILIRUB SERPL-MCNC: 0.4 MG/DL (ref 0–1)
BUN SERPL-MCNC: 9 MG/DL (ref 7–20)
CALCIUM SERPL-MCNC: 9.3 MG/DL (ref 8.3–10.6)
CHLORIDE SERPL-SCNC: 105 MMOL/L (ref 99–110)
CHOLEST SERPL-MCNC: 175 MG/DL (ref 0–199)
CO2 SERPL-SCNC: 25 MMOL/L (ref 21–32)
CREAT SERPL-MCNC: 0.7 MG/DL (ref 0.6–1.1)
DEPRECATED RDW RBC AUTO: 14.2 % (ref 12.4–15.4)
EST. AVERAGE GLUCOSE BLD GHB EST-MCNC: 114 MG/DL
FOLATE SERPL-MCNC: 18.1 NG/ML (ref 4.78–24.2)
GFR SERPLBLD CREATININE-BSD FMLA CKD-EPI: >90 ML/MIN/{1.73_M2}
GLUCOSE SERPL-MCNC: 127 MG/DL (ref 70–99)
HBA1C MFR BLD: 5.6 %
HCT VFR BLD AUTO: 38.6 % (ref 36–48)
HDLC SERPL-MCNC: 32 MG/DL (ref 40–60)
HGB BLD-MCNC: 12.7 G/DL (ref 12–16)
LDLC SERPL CALC-MCNC: 104 MG/DL
MCH RBC QN AUTO: 28.1 PG (ref 26–34)
MCHC RBC AUTO-ENTMCNC: 33 G/DL (ref 31–36)
MCV RBC AUTO: 85.3 FL (ref 80–100)
PLATELET # BLD AUTO: 390 K/UL (ref 135–450)
PMV BLD AUTO: 8.3 FL (ref 5–10.5)
POTASSIUM SERPL-SCNC: 4.6 MMOL/L (ref 3.5–5.1)
PROT SERPL-MCNC: 8.1 G/DL (ref 6.4–8.2)
RBC # BLD AUTO: 4.53 M/UL (ref 4–5.2)
SODIUM SERPL-SCNC: 140 MMOL/L (ref 136–145)
TRIGL SERPL-MCNC: 196 MG/DL (ref 0–150)
TSH SERPL DL<=0.005 MIU/L-ACNC: 1.65 UIU/ML (ref 0.27–4.2)
VIT B12 SERPL-MCNC: 620 PG/ML (ref 211–911)
VLDLC SERPL CALC-MCNC: 39 MG/DL
WBC # BLD AUTO: 6 K/UL (ref 4–11)

## 2025-01-28 PROCEDURE — 36415 COLL VENOUS BLD VENIPUNCTURE: CPT

## 2025-01-28 PROCEDURE — 82746 ASSAY OF FOLIC ACID SERUM: CPT

## 2025-01-28 PROCEDURE — 84443 ASSAY THYROID STIM HORMONE: CPT

## 2025-01-28 PROCEDURE — 82306 VITAMIN D 25 HYDROXY: CPT

## 2025-01-28 PROCEDURE — 83036 HEMOGLOBIN GLYCOSYLATED A1C: CPT

## 2025-01-28 PROCEDURE — 85027 COMPLETE CBC AUTOMATED: CPT

## 2025-01-28 PROCEDURE — 80053 COMPREHEN METABOLIC PANEL: CPT

## 2025-01-28 PROCEDURE — 80061 LIPID PANEL: CPT

## 2025-01-28 PROCEDURE — 82607 VITAMIN B-12: CPT

## 2025-02-08 ENCOUNTER — OFFICE VISIT (OUTPATIENT)
Age: 30
End: 2025-02-08

## 2025-02-08 VITALS
BODY MASS INDEX: 40.15 KG/M2 | RESPIRATION RATE: 16 BRPM | SYSTOLIC BLOOD PRESSURE: 110 MMHG | HEART RATE: 93 BPM | WEIGHT: 241 LBS | HEIGHT: 65 IN | OXYGEN SATURATION: 97 % | TEMPERATURE: 98 F | DIASTOLIC BLOOD PRESSURE: 80 MMHG

## 2025-02-08 DIAGNOSIS — J20.9 ACUTE BRONCHITIS, UNSPECIFIED ORGANISM: Primary | ICD-10-CM

## 2025-02-08 RX ORDER — PREDNISONE 20 MG/1
20 TABLET ORAL 2 TIMES DAILY
Qty: 10 TABLET | Refills: 0 | Status: SHIPPED | OUTPATIENT
Start: 2025-02-08 | End: 2025-02-13

## 2025-02-08 RX ORDER — DEXTROMETHORPHAN HYDROBROMIDE AND PROMETHAZINE HYDROCHLORIDE 15; 6.25 MG/5ML; MG/5ML
5 SYRUP ORAL 4 TIMES DAILY PRN
Qty: 120 ML | Refills: 0 | Status: SHIPPED | OUTPATIENT
Start: 2025-02-08 | End: 2025-02-15

## 2025-02-08 RX ORDER — COPPER 313.4 MG/1
1 INTRAUTERINE DEVICE INTRAUTERINE ONCE
COMMUNITY

## 2025-02-08 RX ORDER — CEFDINIR 300 MG/1
300 CAPSULE ORAL 2 TIMES DAILY
Qty: 20 CAPSULE | Refills: 0 | Status: SHIPPED | OUTPATIENT
Start: 2025-02-08 | End: 2025-02-18

## 2025-02-08 ASSESSMENT — ENCOUNTER SYMPTOMS
RHINORRHEA: 1
SHORTNESS OF BREATH: 0
HEARTBURN: 0
SORE THROAT: 1
COUGH: 1
WHEEZING: 1
HEMOPTYSIS: 0

## 2025-02-08 NOTE — PROGRESS NOTES
Kasia Watson (:  1995) is a 29 y.o. female,New patient, here for evaluation of the following chief complaint(s):  Cough (Cough , sore throat , swollen tonsils , L. earache x 1 day )      ASSESSMENT/PLAN:  1. Acute bronchitis, unspecified organism    - predniSONE (DELTASONE) 20 MG tablet; Take 1 tablet by mouth 2 times daily for 5 days  Dispense: 10 tablet; Refill: 0  - promethazine-dextromethorphan (PROMETHAZINE-DM) 6.25-15 MG/5ML syrup; Take 5 mLs by mouth 4 times daily as needed for Cough  Dispense: 120 mL; Refill: 0  - cefdinir (OMNICEF) 300 MG capsule; Take 1 capsule by mouth 2 times daily for 10 days  Dispense: 20 capsule; Refill: 0       No follow-ups on file.    SUBJECTIVE/OBJECTIVE:    History provided by:  Patient  Cough  This is a new problem. The current episode started in the past 7 days. The problem has been gradually worsening. The cough is Productive of sputum. Associated symptoms include ear pain, nasal congestion, rhinorrhea, a sore throat and wheezing. Pertinent negatives include no chest pain, chills, fever, headaches, heartburn, hemoptysis, myalgias, rash or shortness of breath.       Vitals:    25 1135   BP: 112/86   Site: Right Lower Arm   Position: Sitting   Cuff Size: Small Adult   Pulse: 93   Temp: 98 °F (36.7 °C)   TempSrc: Temporal   SpO2: 97%   Weight: 109.3 kg (241 lb)   Height: 1.651 m (5' 5\")       Review of Systems   Constitutional:  Negative for chills and fever.   HENT:  Positive for ear pain, rhinorrhea and sore throat.    Respiratory:  Positive for cough and wheezing. Negative for hemoptysis and shortness of breath.    Cardiovascular:  Negative for chest pain.   Gastrointestinal:  Negative for heartburn.   Musculoskeletal:  Negative for myalgias.   Skin:  Negative for rash.   Neurological:  Negative for headaches.       Physical Exam  Constitutional:       General: She is not in acute distress.     Appearance: She is obese.   HENT:      Right Ear: Tympanic membrane

## 2025-03-07 ENCOUNTER — TELEMEDICINE (OUTPATIENT)
Dept: BARIATRICS/WEIGHT MGMT | Age: 30
End: 2025-03-07

## 2025-03-07 ENCOUNTER — TELEPHONE (OUTPATIENT)
Dept: BARIATRICS/WEIGHT MGMT | Age: 30
End: 2025-03-07

## 2025-03-07 DIAGNOSIS — E55.9 VITAMIN D INSUFFICIENCY: ICD-10-CM

## 2025-03-07 DIAGNOSIS — Z71.3 DIETARY COUNSELING AND SURVEILLANCE: ICD-10-CM

## 2025-03-07 DIAGNOSIS — E66.01 MORBID OBESITY WITH BMI OF 40.0-44.9, ADULT: Primary | ICD-10-CM

## 2025-03-07 DIAGNOSIS — E78.5 HYPERLIPIDEMIA, UNSPECIFIED HYPERLIPIDEMIA TYPE: ICD-10-CM

## 2025-03-07 ASSESSMENT — ENCOUNTER SYMPTOMS
EYE PAIN: 0
CONSTIPATION: 0
COUGH: 0
CHOKING: 0
APNEA: 0
PHOTOPHOBIA: 0
CHEST TIGHTNESS: 0
NAUSEA: 0
WHEEZING: 0
SHORTNESS OF BREATH: 0
ABDOMINAL DISTENTION: 0
ABDOMINAL PAIN: 0
DIARRHEA: 0
VOMITING: 0
BLOOD IN STOOL: 0

## 2025-03-07 NOTE — PROGRESS NOTES
capsule by mouth 2 times daily., Disp: , Rfl:     NIFEdipine (PROCARDIA) 20 MG capsule, Take 1 capsule by mouth daily, Disp: , Rfl:     ondansetron (ZOFRAN) 4 MG tablet, Take 1 tablet by mouth every 8 hours as needed for Nausea, Disp: 20 tablet, Rfl: 0    valACYclovir (VALTREX) 500 MG tablet, Take 1 tablet by mouth 2 times daily, Disp: , Rfl:     albuterol (ACCUNEB) 0.63 MG/3ML nebulizer solution, Take 3 mLs by nebulization every 6 hours as needed for Wheezing, Disp: , Rfl:     Prenatal Vit-DSS-Fe Cbn-FA (PRENATAL AD) TABS, Take 1 tablet by mouth daily, Disp: 30 tablet, Rfl: 0    Patient Active Problem List   Diagnosis    Non-stress test reactive on fetal surveillance    Morbid obesity with BMI of 40.0-44.9, adult    Essential hypertension       Review of Systems   Constitutional:  Negative for fatigue.   Eyes:  Negative for photophobia, pain and visual disturbance.   Respiratory:  Negative for apnea, cough, choking, chest tightness, shortness of breath and wheezing.    Cardiovascular:  Negative for chest pain, palpitations and leg swelling.   Gastrointestinal:  Negative for abdominal distention, abdominal pain, blood in stool, constipation, diarrhea, nausea and vomiting.   Endocrine: Negative for cold intolerance and heat intolerance.   Musculoskeletal:  Negative for arthralgias and myalgias.   Skin:  Negative for rash.   Neurological:  Negative for dizziness, tremors, syncope, weakness, numbness and headaches.   Psychiatric/Behavioral:  Negative for agitation, confusion, decreased concentration, dysphoric mood, hallucinations, sleep disturbance and suicidal ideas. The patient is not nervous/anxious and is not hyperactive.        Physical Exam  Constitutional:       Appearance: She is well-developed.   HENT:      Head: Normocephalic.   Eyes:      Conjunctiva/sclera: Conjunctivae normal.   Abdominal:      General: Abdomen is protuberant.   Musculoskeletal:         General: No swelling.   Neurological:      Mental

## 2025-03-07 NOTE — TELEPHONE ENCOUNTER
Per Dr. Mtz; pt advised to contact the office to schedule 4 week follow-up appointment once Zepbound medication has been picked up

## 2025-03-17 ENCOUNTER — TELEPHONE (OUTPATIENT)
Dept: BARIATRICS/WEIGHT MGMT | Age: 30
End: 2025-03-17

## 2025-03-17 NOTE — TELEPHONE ENCOUNTER
Pt calling in, stating that her pharmacy [facility], is requesting a PA for pt zepbound in order to process it.  I did let pt know that the pharm usually sends over something, but I would send a message to dr guzman and her staff.

## 2025-03-18 NOTE — TELEPHONE ENCOUNTER
Patient has [employer]. All medications for the treatment of weight loss/obesity are excluded from coverage (Plan Exclusion)   LVM for patient

## 2025-03-21 ENCOUNTER — TELEMEDICINE (OUTPATIENT)
Dept: BARIATRICS/WEIGHT MGMT | Age: 30
End: 2025-03-21

## 2025-03-21 DIAGNOSIS — Z71.3 DIETARY COUNSELING AND SURVEILLANCE: ICD-10-CM

## 2025-03-21 DIAGNOSIS — E66.01 MORBID OBESITY WITH BMI OF 40.0-44.9, ADULT: Primary | ICD-10-CM

## 2025-03-21 ASSESSMENT — ENCOUNTER SYMPTOMS
VOMITING: 0
PHOTOPHOBIA: 0
ABDOMINAL DISTENTION: 0
WHEEZING: 0
DIARRHEA: 0
COUGH: 0
APNEA: 0
BLOOD IN STOOL: 0
CHOKING: 0
EYE PAIN: 0
CONSTIPATION: 0
ABDOMINAL PAIN: 0
CHEST TIGHTNESS: 0
NAUSEA: 0
SHORTNESS OF BREATH: 0

## 2025-03-21 NOTE — PROGRESS NOTES
Patient: Kasia Watson                      Encounter Date: 3/21/2025    YOB: 1995                Age: 29 y.o.    Chief Complaint   Patient presents with    Weight Management     F/u MWM          Patient identification was verified at the start of the visit.         3/21/2025    10:53 AM   Patient-Reported Vitals   Patient-Reported Weight 238   Patient-Reported Height 5'5   Patient-Reported Systolic 140 mmHg   Patient-Reported Diastolic 80 mmHg   Patient-Reported Pulse 112   Patient-Reported Temperature 98.6         BP Readings from Last 1 Encounters:   02/08/25 110/80       BMI Readings from Last 1 Encounters:   02/08/25 40.10 kg/m²       Pulse Readings from Last 1 Encounters:   02/08/25 93          Wt Readings from Last 3 Encounters:   02/08/25 109.3 kg (241 lb)   01/15/25 111.3 kg (245 lb 6.4 oz)   04/22/23 103.6 kg (228 lb 8 oz)        HPI: 29 y.o. female with a long-standing history of obesity presents today for virtual video follow-up. Her weight is stable from her last visit. No significant changes in diet or exercise. Did not start Zepbound due to lack of coverage/cost. Motivated to lose weight.     Allergies   Allergen Reactions    Shellfish-Derived Products Swelling         Current Outpatient Medications:     tirzepatide-weight management (ZEPBOUND) 2.5 MG/0.5ML SOAJ subCUTAneous auto-injector pen, Inject 2.5 mg into the skin every 7 days, Disp: 2 mL, Rfl: 0    Paragard Intrauterine Copper IUD, 1 each by IntraUTERine route once, Disp: , Rfl:     amLODIPine (NORVASC) 5 MG tablet, Take 1 tablet by mouth daily, Disp: , Rfl:     IBUPROFEN PO, Take by mouth, Disp: , Rfl:     QUEtiapine (SEROQUEL) 25 MG tablet, Take 1 tablet by mouth 2 times daily, Disp: , Rfl:     metoprolol succinate (TOPROL XL) 100 MG extended release tablet, Take 1 tablet by mouth 2 times daily, Disp: , Rfl:     gabapentin (NEURONTIN) 300 MG capsule, Take 1 capsule by mouth 2 times daily., Disp: , Rfl:     NIFEdipine

## 2025-03-24 ENCOUNTER — TELEPHONE (OUTPATIENT)
Dept: BARIATRICS/WEIGHT MGMT | Age: 30
End: 2025-03-24

## 2025-03-25 ENCOUNTER — OFFICE VISIT (OUTPATIENT)
Dept: BARIATRICS/WEIGHT MGMT | Age: 30
End: 2025-03-25
Payer: COMMERCIAL

## 2025-03-25 VITALS
HEART RATE: 95 BPM | SYSTOLIC BLOOD PRESSURE: 112 MMHG | RESPIRATION RATE: 18 BRPM | OXYGEN SATURATION: 99 % | BODY MASS INDEX: 40.19 KG/M2 | WEIGHT: 241.2 LBS | DIASTOLIC BLOOD PRESSURE: 68 MMHG | HEIGHT: 65 IN

## 2025-03-25 DIAGNOSIS — K21.9 CHRONIC GERD: ICD-10-CM

## 2025-03-25 DIAGNOSIS — I10 ESSENTIAL HYPERTENSION: Primary | ICD-10-CM

## 2025-03-25 DIAGNOSIS — E66.01 MORBID OBESITY WITH BMI OF 40.0-44.9, ADULT: ICD-10-CM

## 2025-03-25 DIAGNOSIS — Z01.818 PREOPERATIVE CLEARANCE: ICD-10-CM

## 2025-03-25 DIAGNOSIS — K43.9 VENTRAL HERNIA WITHOUT OBSTRUCTION OR GANGRENE: ICD-10-CM

## 2025-03-25 PROBLEM — Z36.89 NON-STRESS TEST REACTIVE ON FETAL SURVEILLANCE: Status: RESOLVED | Noted: 2019-05-27 | Resolved: 2025-03-25

## 2025-03-25 PROCEDURE — 3074F SYST BP LT 130 MM HG: CPT | Performed by: SURGERY

## 2025-03-25 PROCEDURE — 3078F DIAST BP <80 MM HG: CPT | Performed by: SURGERY

## 2025-03-25 PROCEDURE — G2211 COMPLEX E/M VISIT ADD ON: HCPCS | Performed by: SURGERY

## 2025-03-25 PROCEDURE — 99205 OFFICE O/P NEW HI 60 MIN: CPT | Performed by: SURGERY

## 2025-03-25 PROCEDURE — G8417 CALC BMI ABV UP PARAM F/U: HCPCS | Performed by: SURGERY

## 2025-03-25 PROCEDURE — G8427 DOCREV CUR MEDS BY ELIG CLIN: HCPCS | Performed by: SURGERY

## 2025-03-25 PROCEDURE — 1036F TOBACCO NON-USER: CPT | Performed by: SURGERY

## 2025-03-25 NOTE — PROGRESS NOTES
[facility]   [facility]  Jie Mosley MD, FACS, [facility]  [address], [address]    [facility] [address] .  Phone: [phone]  Fax: [phone]       Chief Complaint   Patient presents with    Bariatric, Initial Visit     NP JAMES Hernandez           HPI:    Kasia Watson is a very pleasant 29 y.o. obese female ,   Body mass index is 40.14 kg/m².  And multiple medical problems who is presenting for weight loss surgery evaluation and consultation by Dr. Rivera, Mary YEAGER And Dr. Mtz.    Patient has been struggling for several years now with obesity. Patient feels the weight is an obstacle to achieve and perform things in daily living as well risk on health.     Tries to diet, and exercise but can't keep the weight off.  Patient tried few regimens, but with no sustainable weight loss.     Patient  is very determined to lose weight and be healthy, and is interested in surgical weight loss for future weight loss.   .    Otherwise patient denies any nausea, vomiting, fevers, chills, shortness of breath, chest pain, constipation or urinary symptoms.        Obesity related problems Kasia is dealing with:  Patient Active Problem List    Diagnosis Date Noted    Preoperative clearance 03/25/2025    Chronic GERD 03/25/2025    Ventral hernia without obstruction or gangrene 03/25/2025    Morbid obesity with BMI of 40.0-44.9, adult 01/15/2025    Essential hypertension 01/15/2025           Pain Assessment   Denies any abdominal pain     Past Medical History:   Diagnosis Date    Anxiety     Asthma     Herpes     genital type 2    Hypertension     Irregular heart beat     Miscarriage     X 3    Non-stress test reactive on fetal surveillance 05/27/2019     Past Surgical History:   Procedure Laterality Date    KNEE SURGERY Right      Family History   Problem Relation Age of Onset    High Cholesterol Mother     Diabetes Maternal Grandmother      Social History     Tobacco Use    Smoking status:

## 2025-03-25 NOTE — PATIENT INSTRUCTIONS
Patient received dietary handouts and education.        -Plan for Laparoscopic sleeve gastrectomy      Pre-operative work up Ordered:    - F/U in 4 weeks.   - Nutrition Labs.   - Protein Shake Trial.  - Psych Evaluation.   - Cardiac Clearance.  - EGD (endoscopy to check your stomach).  - Support Group Attendance.   - Obtain letter of medical necessity (PCP Letter).   - Quit Smoking / Chewing tobacco / Vaping  / medical marijuana, Alcohol, Caffeine and Carbonated Drinks.  - Obtain records for Weight History 2 yrs.   - Start Regular Exercise and track your activities.   - Start Tracking your food Intake and follow dietary guidelines.   - Avoid Pregnancy for 2 yrs from date of surgery. (for female patients in childbearing age).          Patient advised that its their responsibility to follow up for studies, referrals and/or labs ordered today.

## 2025-03-25 NOTE — PROGRESS NOTES
Kasia Watson is a 29 y.o. female with a date of birth of 1995.    Vitals:    03/25/25 1004   BP: 112/68   Pulse: 95   Resp: 18   SpO2: 99%    BMI: Body mass index is 40.14 kg/m². Obesity Classification: Class III    Weight History:   Wt Readings from Last 3 Encounters:   03/25/25 109.4 kg (241 lb 3.2 oz)   02/08/25 109.3 kg (241 lb)   01/15/25 111.3 kg (245 lb 6.4 oz)       Patient's lowest adult weight was 210 lbs at age 18.     Patient's highest adult weight was 245 lbs at age 29.     Patient has not participated in the following weight loss programs.   Patient has not participated in meal replacement/liquid diets.  Patient has not participated in weight loss medications.    Patient is not lactose intolerant.  Patient does not have Jainism/cultural food concerns. Patient does have food allergies: shellfish: crab/lobster only. Patient does tolerate artificial sweeteners.     24 hour recall/food frequency chart:  Breakfast: coffe w/ creamer  Snack: none  Lunch: fast food (Mc[employer]onalds, Chipotle, [employer])  Snack:  chips, cookies   Dinner:  skips if still full from lunch or cooks dinner   Snack: Anyhting I can get my hands on while bored or thinking I'm hungry  Drinks throughout the day: sports drinks, water, coffee/Frappuccino, fruit juice  Do you drink alcohol? No.     Patient does not meet the criteria for binge eating disorder. Patient does not have grazing. Patient does have night eating. Patient does have a history of emotional eating or eating out of boredom.    Surgery  Patient does feel confident in her ability to make these changes.  The patient's expectations of post-surgical eating habits. Pt verbalizes understanding.    Patient states she does understand the consequences of not complying with post-op food guidelines.  Patient states she does understands the long term changes in food intake that will be necessary for all occasions after surgery for the rest of her life.      Patient is deemed

## 2025-04-17 ENCOUNTER — CLINICAL DOCUMENTATION (OUTPATIENT)
Dept: BARIATRICS/WEIGHT MGMT | Age: 30
End: 2025-04-17

## 2025-04-17 NOTE — PROGRESS NOTES
This eval was conducted via phone on 4/17/2025 in preparation for their visit on 4/22/25 with Dr. Mosley.     Kasia Watson has no new weight to report.     Breakfast: skip OR eggs + piedra and oatmeal and toast  Snack: none  Lunch: fast food (Mc[employer]onalds, [employer], Chipotle)   Snack: sweets OR chips  Dinner: [employer] (double cheese, small drake, cody pineapple smoothie)  Snack: none    Is pt consuming smaller portions?  Trying but hard sometimes    Is pt consuming at least 64 oz of fluids per day? No 2 bottles per day     Is pt consuming carbonated, caffeinated, or sugary beverages? yes coffee - flavored creamer/equal, dai caramel frappuccino, gatorade, juice     Has pt sampled Unjury and/or Nectar protein? Discussed     Has patient attended a support group? Scheduled 5/1 zoom    Exercise: [employer] 2 days per week - cardio and abs, weights    Plan/Recommendations:   - work on decreasing eating out  - work on decreasing calorie filled beverages  - increase calorie free beverages/water  - try protein powders  - attend SG    Handouts: none    Magdalene Becker, RD, LD

## 2025-04-21 ENCOUNTER — HOSPITAL ENCOUNTER (OUTPATIENT)
Age: 30
Discharge: HOME | End: 2025-04-21
Payer: COMMERCIAL

## 2025-04-21 DIAGNOSIS — E66.01 MORBID OBESITY WITH BMI OF 40.0-44.9, ADULT (HCC): ICD-10-CM

## 2025-04-21 DIAGNOSIS — Z01.818 PREOPERATIVE CLEARANCE: ICD-10-CM

## 2025-04-21 DIAGNOSIS — I10 ESSENTIAL HYPERTENSION: ICD-10-CM

## 2025-04-21 DIAGNOSIS — K21.9 CHRONIC GERD: ICD-10-CM

## 2025-04-21 LAB
25(OH)D3 SERPL-MCNC: 37.4 NG/ML
ALBUMIN SERPL-MCNC: 4.2 G/DL (ref 3.4–5)
ALBUMIN/GLOB SERPL: 1 {RATIO} (ref 1.1–2.2)
ALP SERPL-CCNC: 70 U/L (ref 40–129)
ALT SERPL-CCNC: 19 U/L (ref 10–40)
ANION GAP SERPL CALCULATED.3IONS-SCNC: 13 MMOL/L (ref 3–16)
AST SERPL-CCNC: 15 U/L (ref 15–37)
BASOPHILS # BLD: 0 K/UL (ref 0–0.2)
BASOPHILS NFR BLD: 0.5 %
BILIRUB SERPL-MCNC: 0.4 MG/DL (ref 0–1)
BUN SERPL-MCNC: 11 MG/DL (ref 7–20)
CALCIUM SERPL-MCNC: 9.8 MG/DL (ref 8.3–10.6)
CHLORIDE SERPL-SCNC: 101 MMOL/L (ref 99–110)
CHOLEST SERPL-MCNC: 156 MG/DL (ref 0–199)
CO2 SERPL-SCNC: 23 MMOL/L (ref 21–32)
CREAT SERPL-MCNC: 0.7 MG/DL (ref 0.6–1.1)
DEPRECATED RDW RBC AUTO: 13.5 % (ref 12.4–15.4)
EOSINOPHIL # BLD: 0.1 K/UL (ref 0–0.6)
EOSINOPHIL NFR BLD: 1.8 %
FOLATE SERPL-MCNC: 26.2 NG/ML (ref 4.78–24.2)
GFR SERPLBLD CREATININE-BSD FMLA CKD-EPI: >90 ML/MIN/{1.73_M2}
GLUCOSE SERPL-MCNC: 93 MG/DL (ref 70–99)
HCT VFR BLD AUTO: 37.3 % (ref 36–48)
HDLC SERPL-MCNC: 28 MG/DL (ref 40–60)
HGB BLD-MCNC: 12.8 G/DL (ref 12–16)
INR PPP: 0.94 (ref 0.85–1.15)
IRON SATN MFR SERPL: 16 % (ref 15–50)
IRON SERPL-MCNC: 54 UG/DL (ref 37–145)
LDLC SERPL CALC-MCNC: 70 MG/DL
LYMPHOCYTES # BLD: 3.3 K/UL (ref 1–5.1)
LYMPHOCYTES NFR BLD: 46.8 %
MCH RBC QN AUTO: 28.4 PG (ref 26–34)
MCHC RBC AUTO-ENTMCNC: 34.3 G/DL (ref 31–36)
MCV RBC AUTO: 82.6 FL (ref 80–100)
MONOCYTES # BLD: 0.5 K/UL (ref 0–1.3)
MONOCYTES NFR BLD: 6.7 %
NEUTROPHILS # BLD: 3.1 K/UL (ref 1.7–7.7)
NEUTROPHILS NFR BLD: 44.2 %
PLATELET # BLD AUTO: 415 K/UL (ref 135–450)
PMV BLD AUTO: 8.4 FL (ref 5–10.5)
POTASSIUM SERPL-SCNC: 4 MMOL/L (ref 3.5–5.1)
PROT SERPL-MCNC: 8.3 G/DL (ref 6.4–8.2)
PROTHROMBIN TIME: 12.8 SEC (ref 11.9–14.9)
RBC # BLD AUTO: 4.51 M/UL (ref 4–5.2)
SODIUM SERPL-SCNC: 137 MMOL/L (ref 136–145)
TIBC SERPL-MCNC: 339 UG/DL (ref 260–445)
TRIGL SERPL-MCNC: 289 MG/DL (ref 0–150)
TSH SERPL DL<=0.005 MIU/L-ACNC: 2.02 UIU/ML (ref 0.27–4.2)
VIT B12 SERPL-MCNC: 528 PG/ML (ref 211–911)
VLDLC SERPL CALC-MCNC: 58 MG/DL
WBC # BLD AUTO: 7.1 K/UL (ref 4–11)

## 2025-04-21 PROCEDURE — 85025 COMPLETE CBC W/AUTO DIFF WBC: CPT

## 2025-04-21 PROCEDURE — 83540 ASSAY OF IRON: CPT

## 2025-04-21 PROCEDURE — 80053 COMPREHEN METABOLIC PANEL: CPT

## 2025-04-21 PROCEDURE — 82746 ASSAY OF FOLIC ACID SERUM: CPT

## 2025-04-21 PROCEDURE — 82306 VITAMIN D 25 HYDROXY: CPT

## 2025-04-21 PROCEDURE — 84425 ASSAY OF VITAMIN B-1: CPT

## 2025-04-21 PROCEDURE — 36415 COLL VENOUS BLD VENIPUNCTURE: CPT

## 2025-04-21 PROCEDURE — 80061 LIPID PANEL: CPT

## 2025-04-21 PROCEDURE — 85610 PROTHROMBIN TIME: CPT

## 2025-04-21 PROCEDURE — 83550 IRON BINDING TEST: CPT

## 2025-04-21 PROCEDURE — 82607 VITAMIN B-12: CPT

## 2025-04-21 PROCEDURE — 83036 HEMOGLOBIN GLYCOSYLATED A1C: CPT

## 2025-04-21 PROCEDURE — 84443 ASSAY THYROID STIM HORMONE: CPT

## 2025-04-22 ENCOUNTER — OFFICE VISIT (OUTPATIENT)
Dept: BARIATRICS/WEIGHT MGMT | Age: 30
End: 2025-04-22
Payer: COMMERCIAL

## 2025-04-22 VITALS
HEIGHT: 65 IN | BODY MASS INDEX: 40.15 KG/M2 | SYSTOLIC BLOOD PRESSURE: 130 MMHG | HEART RATE: 84 BPM | DIASTOLIC BLOOD PRESSURE: 87 MMHG | WEIGHT: 241 LBS

## 2025-04-22 DIAGNOSIS — K21.9 CHRONIC GERD: ICD-10-CM

## 2025-04-22 DIAGNOSIS — I10 ESSENTIAL HYPERTENSION: ICD-10-CM

## 2025-04-22 DIAGNOSIS — E78.1 HYPERTRIGLYCERIDEMIA: Primary | ICD-10-CM

## 2025-04-22 DIAGNOSIS — E66.01 MORBID OBESITY WITH BMI OF 40.0-44.9, ADULT (HCC): ICD-10-CM

## 2025-04-22 DIAGNOSIS — K43.9 VENTRAL HERNIA WITHOUT OBSTRUCTION OR GANGRENE: ICD-10-CM

## 2025-04-22 LAB
EST. AVERAGE GLUCOSE BLD GHB EST-MCNC: 111.2 MG/DL
HBA1C MFR BLD: 5.5 %

## 2025-04-22 PROCEDURE — G2211 COMPLEX E/M VISIT ADD ON: HCPCS | Performed by: SURGERY

## 2025-04-22 PROCEDURE — G8427 DOCREV CUR MEDS BY ELIG CLIN: HCPCS | Performed by: SURGERY

## 2025-04-22 PROCEDURE — 3079F DIAST BP 80-89 MM HG: CPT | Performed by: SURGERY

## 2025-04-22 PROCEDURE — 3075F SYST BP GE 130 - 139MM HG: CPT | Performed by: SURGERY

## 2025-04-22 PROCEDURE — 99214 OFFICE O/P EST MOD 30 MIN: CPT | Performed by: SURGERY

## 2025-04-22 PROCEDURE — 1036F TOBACCO NON-USER: CPT | Performed by: SURGERY

## 2025-04-22 PROCEDURE — G8417 CALC BMI ABV UP PARAM F/U: HCPCS | Performed by: SURGERY

## 2025-04-22 NOTE — PROGRESS NOTES
[facility]  Cardiology Note  [phone]      Chief Complaint   Patient presents with    New Patient    Cardiac Clearance    Hypertension        History of Present Illness:  Kasia Watson is a 29 y.o. patient PMHx hypertension, chronic GERD, obesity.    Patient referred for cardiac risk assessment for upcoming bariatric surgery with Dr. Mosley. I have been asked to provide consultation regarding further management and testing.    Patient reports regular exercise. Goes to [employer] 3x/week, walks outside on nice days, and uses her treadmill at home. She denies chest pain or SOB with exertion.     Does take amlodipine 5 mg for high blood pressure, reports stable readings at home.     Past Medical History:   has a past medical history of Anxiety, Asthma, Herpes, Hypertension, Irregular heart beat, Miscarriage, and Non-stress test reactive on fetal surveillance.    Surgical History:   has a past surgical history that includes knee surgery (Right, 2022).     Social History:   reports that she has never smoked. She has never been exposed to tobacco smoke. She has never used smokeless tobacco. She reports that she does not drink alcohol and does not use drugs.     Family History:  Family History   Problem Relation Age of Onset    High Cholesterol Mother     Diabetes Maternal Grandmother     Heart Disease Paternal Grandmother        Home Medications:  Were reviewed and are listed in nursing record. and/or listed below  Prior to Admission medications    Medication Sig Start Date End Date Taking? Authorizing Provider   Paragard Intrauterine Copper IUD 1 each by IntraUTERine route once   Yes Marisela Ceballos MD   amLODIPine (NORVASC) 5 MG tablet Take 1 tablet by mouth daily   Yes Marisela Ceballos MD   IBUPROFEN PO Take by mouth as needed   Yes Marisela Ceballos MD   QUEtiapine (SEROQUEL) 25 MG tablet Take 1 tablet by mouth 2 times daily   Yes Marisela Ceballos MD

## 2025-04-22 NOTE — PROGRESS NOTES
ED General





<SIMONE LEE IV - Last Filed: 03/08/20 06:49>





- General


TRAVEL OUTSIDE OF THE [place] IN LAST 30 DAYS: No





<PHILLIP ESCOBAR - Last Filed: 03/11/20 15:10>





- General


Chief Complaint: Respiratory Distress


Stated Complaint: SHORTNESS OF BREATH


Time Seen by Provider: 03/08/20 00:54





- HPI


Notes: 





Chief complaint: Shortness of breath and difficulty with urination





Mr. Caceres is a 63-year-old male with a history of coronary disease status po

st CABG and subsequent placement of a pacemaker defibrillator.  He underwent an 

ablation procedure at [facility] on February 11 of this year.  Over the 

past several days he has had difficulty emptying his bladder and tonight became 

very short of breath and tachycardic.  No chest pain.  Slight dry cough.  No 

sputum production.  No nausea vomiting.  No fever chills reported.  Denies back 

pain. (PHILLIP ESCOBAR)





- Related Data


Allergies/Adverse Reactions: 


                                        





haloperidol [From Haldol] Allergy (Verified 12/13/18 15:56)


   


adhesive tape Adverse Reaction (Verified 12/13/18 15:56)


   











Past Medical History





- General


Information source: Patient, Relative





- Social History


Smoking Status: Former Smoker


Chew tobacco use (# tins/day): No


Frequency of alcohol use: None


Drug Abuse: None


Lives with: Family


Family History: Reviewed & Not Pertinent


Patient has suicidal ideation: No


Patient has homicidal ideation: No





- Past Medical History


Cardiac Medical History: Reports: Hx Heart Attack - x 8


Endocrine Medical History: Denies: Hx Diabetes Mellitus Type 1, Hx Diabetes 

Mellitus Type 2


Renal/ Medical History: Denies: Hx Peritoneal Dialysis


Psychiatric Medical History: Reports: Hx Schizophrenia


Past Surgical History: Reports: Hx Cardiac Catheterization - bipass x4, Hx 

Cholecystectomy - PACEMAKER/DEFIB, Other - Defibrillator/pacemaker.





<PHILLIP ESCOBAR - Last Filed: 03/11/20 15:10>





Review of Systems





<PHILLIP ESCOBAR - Last Filed: 03/11/20 15:10>





- Review of Systems


Notes: 





Constitutional: Negative for fever.


HENT: Negative for sore throat.


Eyes: Negative for visual changes.


Cardiovascular: Negative for chest pain.


Respiratory: As per HPI.


Gastrointestinal: Negative for abdominal pain, vomiting or diarrhea.


Genitourinary: As per HPI.


Musculoskeletal: Negative for back pain.


Skin: Negative for rash.


Neurological: Negative for headaches, weakness or numbness.





10 point ROS negative except as marked above and in HPI.


 (PHILLIP ESCOBAR)





Physical Exam





<PHILLIP ESCOBAR - Last Filed: 03/11/20 15:10>





- Vital signs


Vitals: 


                                        











Pulse Ox


 


 96 


 


 03/08/20 00:10














- Notes


Notes: 











GENERAL: Male patient appearing somewhat older than stated age who is dyspneic.





SKIN: Moist and warm.  Good turgor no rashes.





HEAD: Normocephalic atraumatic.





EYES: PERRLA.  EOMI.  Conjunctivae and sclerae clear.





EARS: CANALS AND TMS CLEAR.





NOSE: CLEAR.





MOUTH: Moist mucosa.  Good dentition.  No stridor or edema.  No drooling.





NECK: Supple.  No masses or thyromegaly.  No adenopathy.  Carotids 2+ without 

bruits.  No JVD.





BACK: Symmetrical without tenderness.





CHEST: Pacemaker/defibrillator over left anterior chest wall.  Healed CABG scar 

present.  Mildly tachypneic.  Breath sounds clear and symmetrical.





HEART: Tachycardic regular rhythm.  No murmur gallop or rub.





ABDOMEN: Mildly obese.  Soft nontender without masses, organomegaly or rebound. 

Bowel sounds normally active.  No bruits.





GENITALIA: Deferred.





EXTREMITIES: Bilateral trace pretibial edema.  No calf tenderness.  Cap refill 

less than 1.5 seconds.  Dorsalis pedis and posterior tibial pulses 3+ and 

symmetrical.





NEUROLOGICAL: GCS 15.  Alert and oriented x3.  Fluent speech.  Cranial nerves II

through XII intact.  Sensorimotor and cerebellar normal.  Normal tone.





PSYCHIATRIC: Appropriate affect. (PHILLIP ESCOBAR)





Course





- Laboratory


Result Diagrams: 


                                 03/08/20 00:15





                                 03/08/20 00:15





- Consults


  ** dr. horn


Time consulted: 06:49 - [facility]





<SIMONE LEE IV - Last Filed: 03/08/20 06:49>





- Laboratory


Result Diagrams: 


                                 03/08/20 00:15





                                 03/08/20 00:15





<PHILLIP ESCOBAR - Last Filed: 03/11/20 15:10>





- Re-evaluation


Re-evalutation: 





03/08/20 06:49


Results discussed with patient.  Patient accepted recommendation for admission. 

(SIMONE LEE IV)





03/08/20 03:17


This man has a dirty urine which will be cultured.  We have started him on IV 

Rocephin.  Blood cultures pending.  His lactate is 1.3.  His blood pressures on 

the low side around 110 systolic.  He was quite dyspneic when he came in.  His 

venous blood gas is pending.  His chest x-ray did not show any focal infiltrate.

 He has an elevated BNP and will have limited tolerance for IV fluids.  He was 

also mildly hypoxemic when he arrived and he is oxygenating well at present on 2

L nasal O2.  His d-dimer is elevated and he has had a recent surgical procedure 

for this reason we will also order a CTA of the chest to rule out pulmonary 

embolus.


03/08/20 03:19


Further care of the patient is turned over to Dr. Lee at this time with 

final disposition pending. (PHILLIP ESCOBAR)





- Vital Signs


Vital signs: 


                                        











Temp Pulse Resp BP Pulse Ox


 


 98.5 F      20   113/65   96 


 


 03/08/20 13:40     03/08/20 13:40  03/08/20 13:40  03/08/20 13:40














- Laboratory


Laboratory results interpreted by me: 


                                        











  03/08/20 03/08/20 03/08/20





  00:15 00:15 00:15


 


WBC  11.6 H  


 


RDW  16.2 H  


 


Absolute Neuts (auto)  9.1 H  


 


D-Dimer   


 


VBG pH   


 


VBG pCO2   


 


VBG HCO3   


 


Sodium   133.0 L 


 


Potassium   5.2 H 


 


Creatinine   1.50 H 


 


Est GFR ( Amer)   57 L 


 


Est GFR (MDRD) Non-Af   47 L 


 


Glucose   134 H 


 


Alkaline Phosphatase   154 H 


 


NT-Pro-B Natriuret Pep    5400 H


 


Urine Protein   


 


Urine Blood   


 


Ur Leukocyte Esterase   














  03/08/20 03/08/20 03/08/20





  00:15 01:05 03:10


 


WBC   


 


RDW   


 


Absolute Neuts (auto)   


 


D-Dimer  1.20 H  


 


VBG pH    7.45 H


 


VBG pCO2    26.9 L


 


VBG HCO3    18.2 L


 


Sodium   


 


Potassium   


 


Creatinine   


 


Est GFR ( Amer)   


 


Est GFR (MDRD) Non-Af   


 


Glucose   


 


Alkaline Phosphatase   


 


NT-Pro-B Natriuret Pep   


 


Urine Protein   100 H 


 


Urine Blood   SMALL H 


 


Ur Leukocyte Esterase   LARGE H 














- Consults


  ** dr. horn


Reason for consultation: 





03/08/20 06:50


uti, hypotension, renal insufficiency, generalized weakness (SIMONE LEE IV)





Discharge





<SIMONE LEE IV - Last Filed: 03/08/20 06:49>





<PHILLIP ESCOBAR - Last Filed: 03/11/20 15:10>





- Discharge


Clinical Impression: 


UTI (urinary tract infection)


Qualifiers:


 Urinary tract infection type: site unspecified Hematuria presence: without 

hematuria Qualified Code(s): N39.0 - Urinary tract infection, site not specified





Condition: Good


Disposition: [facility]   [facility]  Jie Mosley MD, FACS, [place] [address], [address]    [place] [facility] [address] .  Phone: [phone]  Fax: [phone]          Chief Complaint   Patient presents with    Obesity     2nd pre surg          HPI:     Kasia Watson is a very pleasant 29 y.o. female with Body mass index is 40.1 kg/m². / Chronic Obesity.     Kasia has been struggling for several years now with obesity. Kasia feels the weight is an obstacle to achieve and perform things in daily living as well risk on health.     Patient  is very determined to lose weight and be healthy, and is working towards  surgical weight loss to achieve this goal. Pre-operative clearance and work up pending. Working hard to keep good dietary habits as well level of activity.  Patient denies any nausea, vomiting, fevers, chills, shortness of breath, chest pain, cough, constipation or difficulty urinating.    Pain Assessment   Denies any abdominal pain       Past Medical History:   Diagnosis Date    Anxiety     Asthma     Herpes     genital type 2    Hypertension     Irregular heart beat     Miscarriage     X 3    Non-stress test reactive on fetal surveillance 05/27/2019     Past Surgical History:   Procedure Laterality Date    KNEE SURGERY Right      Family History   Problem Relation Age of Onset    High Cholesterol Mother     Diabetes Maternal Grandmother      Social History     Tobacco Use    Smoking status: Never    Smokeless tobacco: Never   Substance Use Topics    Alcohol use: No     I counseled the patient on the importance of not smoking and risks of ETOH.   Allergies   Allergen Reactions    Shellfish-Derived Products Swelling     Vitals:    04/22/25 1007   BP: 130/87   Pulse: 84   Weight: 109.3 kg (241 lb)   Height: 1.651 m (5' 5\")       Body mass index is 40.1 kg/m².    Lab Results   Component Value Date/Time    WBC 7.1 04/21/2025 03:40 PM    RBC 4.51 04/21/2025 03:40 PM    HGB 12.8 04/21/2025

## 2025-04-24 PROBLEM — Z01.818 PREOPERATIVE CLEARANCE: Status: RESOLVED | Noted: 2025-03-25 | Resolved: 2025-04-24

## 2025-04-24 LAB — VIT B1 BLD-MCNC: 126 NMOL/L (ref 70–180)

## 2025-05-06 ENCOUNTER — OFFICE VISIT (OUTPATIENT)
Dept: CARDIOLOGY CLINIC | Age: 30
End: 2025-05-06
Payer: COMMERCIAL

## 2025-05-06 VITALS
WEIGHT: 240.6 LBS | BODY MASS INDEX: 40.08 KG/M2 | HEIGHT: 65 IN | OXYGEN SATURATION: 98 % | SYSTOLIC BLOOD PRESSURE: 122 MMHG | HEART RATE: 85 BPM | DIASTOLIC BLOOD PRESSURE: 86 MMHG

## 2025-05-06 DIAGNOSIS — Z01.818 PREOPERATIVE CLEARANCE: ICD-10-CM

## 2025-05-06 DIAGNOSIS — E66.01 MORBID OBESITY WITH BMI OF 40.0-44.9, ADULT (HCC): ICD-10-CM

## 2025-05-06 DIAGNOSIS — I10 ESSENTIAL HYPERTENSION: Primary | ICD-10-CM

## 2025-05-06 PROCEDURE — 1036F TOBACCO NON-USER: CPT | Performed by: INTERNAL MEDICINE

## 2025-05-06 PROCEDURE — G8427 DOCREV CUR MEDS BY ELIG CLIN: HCPCS | Performed by: INTERNAL MEDICINE

## 2025-05-06 PROCEDURE — 3074F SYST BP LT 130 MM HG: CPT | Performed by: INTERNAL MEDICINE

## 2025-05-06 PROCEDURE — 99203 OFFICE O/P NEW LOW 30 MIN: CPT | Performed by: INTERNAL MEDICINE

## 2025-05-06 PROCEDURE — G8417 CALC BMI ABV UP PARAM F/U: HCPCS | Performed by: INTERNAL MEDICINE

## 2025-05-06 PROCEDURE — 93000 ELECTROCARDIOGRAM COMPLETE: CPT | Performed by: INTERNAL MEDICINE

## 2025-05-06 PROCEDURE — 3079F DIAST BP 80-89 MM HG: CPT | Performed by: INTERNAL MEDICINE

## 2025-05-07 ENCOUNTER — PREP FOR PROCEDURE (OUTPATIENT)
Dept: BARIATRICS/WEIGHT MGMT | Age: 30
End: 2025-05-07

## 2025-05-08 RX ORDER — SODIUM CHLORIDE 0.9 % (FLUSH) 0.9 %
5-40 SYRINGE (ML) INJECTION EVERY 12 HOURS SCHEDULED
Status: CANCELLED | OUTPATIENT
Start: 2025-05-08

## 2025-05-08 RX ORDER — SODIUM CHLORIDE 0.9 % (FLUSH) 0.9 %
5-40 SYRINGE (ML) INJECTION PRN
Status: CANCELLED | OUTPATIENT
Start: 2025-05-08

## 2025-05-08 RX ORDER — SODIUM CHLORIDE 9 MG/ML
25 INJECTION, SOLUTION INTRAVENOUS PRN
Status: CANCELLED | OUTPATIENT
Start: 2025-05-08

## 2025-05-19 ENCOUNTER — TELEPHONE (OUTPATIENT)
Dept: BARIATRICS/WEIGHT MGMT | Age: 30
End: 2025-05-19

## 2025-05-19 NOTE — PROGRESS NOTES
Patient reached __X__ yes  _____ no         MY Chart message sent  _YES__  VM instructions left ____ yes   phone number ________                                ____ no-office notified    [address], [place]   [address]      Date ___5/23/2025______  Time _______  Arrival ___/per office__XX_    Nothing to eat or drink after midnight-follow your doctors prep instructions-this may include taking a second dose of your prep after midnight    Responsible adult 18 or older to stay on site while you are here-drive you home-stay with you after    Dress comfortably-No makeup or jewlrey    Follow any instructions your doctors office has given you    Bring a complete list of all your medications and supplements including name,dose,how often taken the day of your procedure     If you normally take the following medications in the morning please do so the AM of your procedure with a small sip of water       Heart,blood pressure,seizure,thyroid or breathing medications-use your inhalers-bring any rescue inhalers with you DOS       DO NOT take blood pressure medications ending in \"slade\" or \"pril\" the AM of procedure or evening prior  N/A    Take half or your normal dose of any long acting insulins the night before your procedure-do not take any diabetic medications the AM of procedure. N/A    If you take a weekly injection for diabetes or weight loss-do not take one week prior to surgery/procedure.If you have already taken your injection this week,contact your surgeon. There is a possibility of cancellation if taken.  N/A  If you take any SGLT2  medications such as Jardiance ,Invokana, Synjardy or Farxiga. You need to stop these 3 days prior to surgery/procedure. If you have already taken, contact your surgeon. There is a possibility of cancellation if taken.  N/A    Follow your doctors instructions regarding stopping or taking  any blood thinners-if you do not have instructions-call them  N/A    Any questions  call your doctor    Other ____BRING YOUR ALBUTEROL INHALER__________________________________________________________      _XX_ DR Underwood patients are to be on clear liquids the day before - NO medications the morning of EGD                VISITOR POLICY(subject to change)             The current policy is 2 visitors per patient.There are no children allowed.Mask at discretion of facility. Visiting hours are 8a-8p.Overnight visitors will be at the discretion of the nurse. All policies are subject to change.

## 2025-05-20 ENCOUNTER — OFFICE VISIT (OUTPATIENT)
Dept: BARIATRICS/WEIGHT MGMT | Age: 30
End: 2025-05-20
Payer: COMMERCIAL

## 2025-05-20 VITALS
HEART RATE: 84 BPM | HEIGHT: 65 IN | RESPIRATION RATE: 18 BRPM | DIASTOLIC BLOOD PRESSURE: 84 MMHG | BODY MASS INDEX: 39.61 KG/M2 | SYSTOLIC BLOOD PRESSURE: 137 MMHG | OXYGEN SATURATION: 98 %

## 2025-05-20 DIAGNOSIS — K21.9 CHRONIC GERD: ICD-10-CM

## 2025-05-20 DIAGNOSIS — K43.9 VENTRAL HERNIA WITHOUT OBSTRUCTION OR GANGRENE: ICD-10-CM

## 2025-05-20 DIAGNOSIS — E66.01 MORBID OBESITY WITH BMI OF 40.0-44.9, ADULT (HCC): Primary | ICD-10-CM

## 2025-05-20 DIAGNOSIS — E78.1 HYPERTRIGLYCERIDEMIA: ICD-10-CM

## 2025-05-20 DIAGNOSIS — I10 ESSENTIAL HYPERTENSION: ICD-10-CM

## 2025-05-20 PROCEDURE — G8427 DOCREV CUR MEDS BY ELIG CLIN: HCPCS | Performed by: SURGERY

## 2025-05-20 PROCEDURE — G2211 COMPLEX E/M VISIT ADD ON: HCPCS | Performed by: SURGERY

## 2025-05-20 PROCEDURE — 3075F SYST BP GE 130 - 139MM HG: CPT | Performed by: SURGERY

## 2025-05-20 PROCEDURE — G8417 CALC BMI ABV UP PARAM F/U: HCPCS | Performed by: SURGERY

## 2025-05-20 PROCEDURE — 1036F TOBACCO NON-USER: CPT | Performed by: SURGERY

## 2025-05-20 PROCEDURE — 3079F DIAST BP 80-89 MM HG: CPT | Performed by: SURGERY

## 2025-05-20 PROCEDURE — 99214 OFFICE O/P EST MOD 30 MIN: CPT | Performed by: SURGERY

## 2025-05-20 NOTE — PROGRESS NOTES
Kasia Watson lost 3 lbs over 1 mo.     Breakfast: skip OR eggs + piedra  Snack: none OR fruit (grapes, apple, or banana) OR Greek yogurt  Lunch: 2 slices of pizza   Snack: none   Dinner: fried bologna sandwich   Snack: oreos     Is pt consuming smaller portions?  Trying     Is pt consuming at least 64 oz of fluids per day? No. 3 bottles of water     Is pt consuming carbonated, caffeinated, or sugary beverages? yes 1 soda/ day, coffee daily w/ creamer/equal , apple juice, Hawaiian Punch     Has pt sampled Unjury and/or Nectar protein? Encouraged pt to try grab'n'go's before next appointment.    Has patient attended a support group? Not scheduled     Exercise: [employer] 2x/week- cardio, abs, weight lifting     Plan/Recommendations:    - Limit high fat/sugar foods (pizza, fried bologna sandwiches, oreos)  - Eliminate carbonation, caffeine and sugary beverages  - Increase calorie free beverages/water  - Try protein powders  - Attend SG    Handouts: none     Ro Viramontes RD  
Paragard Intrauterine Copper IUD, 1 each by IntraUTERine route once, Disp: , Rfl:     amLODIPine (NORVASC) 5 MG tablet, Take 1 tablet by mouth daily, Disp: , Rfl:     IBUPROFEN PO, Take by mouth as needed, Disp: , Rfl:     QUEtiapine (SEROQUEL) 25 MG tablet, Take 1 tablet by mouth 2 times daily, Disp: , Rfl:     ondansetron (ZOFRAN) 4 MG tablet, Take 1 tablet by mouth every 8 hours as needed for Nausea, Disp: 20 tablet, Rfl: 0    valACYclovir (VALTREX) 500 MG tablet, Take 1 tablet by mouth as needed, Disp: , Rfl:     albuterol (ACCUNEB) 0.63 MG/3ML nebulizer solution, Take 3 mLs by nebulization every 6 hours as needed for Wheezing, Disp: , Rfl:     Review of Systems - History obtained from the patient  General ROS: negative  Psychological ROS: negative  Ophthalmic ROS: negative  Neurological ROS: negative  ENT ROS: negative  Allergy and Immunology ROS: negative  Hematological and Lymphatic ROS: negative  Endocrine ROS: negative  Breast ROS: negative  Respiratory ROS: negative  Cardiovascular ROS: negative  Gastrointestinal ROS:negative  Genito-Urinary ROS: negative  Musculoskeletal ROS: negative   Skin ROS: negative    Physical Exam   Vitals Reviewed   Constitutional: Patient is oriented to person, place, and time. Patient appears well-developed and well-nourished. Patient is active and cooperative.  Non-toxic appearance. No distress.   HENT:   Head: Normocephalic and atraumatic. Head is without abrasion and without laceration. Hair is normal.   Right Ear: External ear normal. No lacerations. No drainage, swelling .   Left Ear: External ear normal. No lacerations. No drainage, swelling.   Nose/Mouth: no abrasions nor lesions.  Eyes: Conjunctivae, EOM and lids are normal. Right eye exhibits no discharge. No foreign body present in the right eye. Left eye exhibits no discharge. No foreign body present in the left eye. No scleral icterus.   Neck:No JVD present.   Pulmonary/Chest: Effort normal. No accessory muscle

## 2025-05-20 NOTE — PATIENT INSTRUCTIONS
Patient received dietary handouts and education.    Plan/Recommendations:   - Limit high fat foods (pizza/ fried bologna sandwiches)  - Eliminate carbonation, caffeine and sugary beverages  - Increase calorie free beverages/water  - Try protein powders  - Attend SG

## 2025-05-22 NOTE — DISCHARGE INSTRUCTIONS
ENDOSCOPY DISCHARGE INSTRUCTIONS    You may experience some lightheadedness for the next several hours.  Plan on quiet relaxation for the rest of today.  A responsible adult needs to stay with you today.  Because of the medications you received today-do not drive,operate machinery,or sign any contractual agreement for the next 24 hours.  Do not drink any alcoholic beverages or take any unprescribed medications tonight.  Eat bland food and avoid anything greasy or spicy initially-progress to your normal diet gradually.  Diet restrictions as instructed.  If you have any of the following problems, notify your physician or return to the hospital emergency room : fever, chills, excessive bleeding, excessive vomiting, difficulty swallowing, uncontrolled pain, increased abdominal distention, shortness of breath or any other problems.  If you have a sore throat, you may use lozenges or salt water gargles.  Please call Dr. Mosley's office in 5 business days for biopsy results [phone].      ANESTHESIA DISCHARGE INSTRUCTIONS    Wear your seatbelt home.  You are under the influence of drugs-do not drink alcohol,drive,operate machinery,or make any important decisions or sign any legal documentsfor 24 hours  Children should not ride bikes,skate boards or play on gym sets  for 24 hours after surgery.  A responsible adult needs to be with you for 24 hours.  You may experience lightheadedness,dizziness,or sleepiness following surgery.  Rest at home today- increase activity as tolerated.  Progress slowly to a regular diet unless your physician has instructed you otherwise.Drink plenty of water.  If nausea becomes a problem call your physician.  Coughing,sore throat,and muscle aches are other side effects of anesthesia,and should improve with time.  Do not drive,operate machinery while taking narcotics.

## 2025-05-23 ENCOUNTER — ANESTHESIA EVENT (OUTPATIENT)
Dept: ENDOSCOPY | Age: 30
End: 2025-05-23
Payer: COMMERCIAL

## 2025-05-23 ENCOUNTER — HOSPITAL ENCOUNTER (OUTPATIENT)
Age: 30
Setting detail: OUTPATIENT SURGERY
Discharge: HOME | End: 2025-05-23
Attending: SURGERY | Admitting: SURGERY
Payer: COMMERCIAL

## 2025-05-23 ENCOUNTER — ANESTHESIA (OUTPATIENT)
Dept: ENDOSCOPY | Age: 30
End: 2025-05-23
Payer: COMMERCIAL

## 2025-05-23 VITALS
HEART RATE: 84 BPM | WEIGHT: 235.6 LBS | TEMPERATURE: 98.8 F | RESPIRATION RATE: 16 BRPM | HEIGHT: 65 IN | SYSTOLIC BLOOD PRESSURE: 150 MMHG | BODY MASS INDEX: 39.25 KG/M2 | OXYGEN SATURATION: 100 % | DIASTOLIC BLOOD PRESSURE: 102 MMHG

## 2025-05-23 DIAGNOSIS — K21.9 CHRONIC GERD: ICD-10-CM

## 2025-05-23 LAB
GLUCOSE BLD-MCNC: 106 MG/DL (ref 70–99)
HCG UR QL: NEGATIVE
PERFORMED ON: ABNORMAL

## 2025-05-23 PROCEDURE — 43239 EGD BIOPSY SINGLE/MULTIPLE: CPT | Performed by: SURGERY

## 2025-05-23 PROCEDURE — 7100000010 HC PHASE II RECOVERY - FIRST 15 MIN: Performed by: SURGERY

## 2025-05-23 PROCEDURE — 3609012400 HC EGD TRANSORAL BIOPSY SINGLE/MULTIPLE: Performed by: SURGERY

## 2025-05-23 PROCEDURE — 88305 TISSUE EXAM BY PATHOLOGIST: CPT

## 2025-05-23 PROCEDURE — 2500000003 HC RX 250 WO HCPCS: Performed by: NURSE ANESTHETIST, CERTIFIED REGISTERED

## 2025-05-23 PROCEDURE — 2580000003 HC RX 258: Performed by: SURGERY

## 2025-05-23 PROCEDURE — 84703 CHORIONIC GONADOTROPIN ASSAY: CPT

## 2025-05-23 PROCEDURE — 7100000011 HC PHASE II RECOVERY - ADDTL 15 MIN: Performed by: SURGERY

## 2025-05-23 PROCEDURE — 2709999900 HC NON-CHARGEABLE SUPPLY: Performed by: SURGERY

## 2025-05-23 PROCEDURE — 3700000000 HC ANESTHESIA ATTENDED CARE: Performed by: SURGERY

## 2025-05-23 PROCEDURE — 6360000002 HC RX W HCPCS: Performed by: NURSE ANESTHETIST, CERTIFIED REGISTERED

## 2025-05-23 RX ORDER — GLYCOPYRROLATE 1 MG/5 ML
SYRINGE (ML) INTRAVENOUS
Status: DISCONTINUED | OUTPATIENT
Start: 2025-05-23 | End: 2025-05-23 | Stop reason: SDUPTHER

## 2025-05-23 RX ORDER — OMEPRAZOLE 20 MG/1
20 CAPSULE, DELAYED RELEASE ORAL
Qty: 90 CAPSULE | Refills: 1 | Status: SHIPPED | OUTPATIENT
Start: 2025-05-23

## 2025-05-23 RX ORDER — PROPOFOL 10 MG/ML
INJECTION, EMULSION INTRAVENOUS
Status: DISCONTINUED | OUTPATIENT
Start: 2025-05-23 | End: 2025-05-23 | Stop reason: SDUPTHER

## 2025-05-23 RX ORDER — SODIUM CHLORIDE 0.9 % (FLUSH) 0.9 %
5-40 SYRINGE (ML) INJECTION PRN
Status: DISCONTINUED | OUTPATIENT
Start: 2025-05-23 | End: 2025-05-23 | Stop reason: HOSPADM

## 2025-05-23 RX ORDER — SODIUM CHLORIDE 0.9 % (FLUSH) 0.9 %
5-40 SYRINGE (ML) INJECTION EVERY 12 HOURS SCHEDULED
Status: DISCONTINUED | OUTPATIENT
Start: 2025-05-23 | End: 2025-05-23 | Stop reason: HOSPADM

## 2025-05-23 RX ORDER — SODIUM CHLORIDE 9 MG/ML
INJECTION, SOLUTION INTRAVENOUS PRN
Status: CANCELLED | OUTPATIENT
Start: 2025-05-23

## 2025-05-23 RX ORDER — LIDOCAINE HYDROCHLORIDE 20 MG/ML
INJECTION, SOLUTION EPIDURAL; INFILTRATION; INTRACAUDAL; PERINEURAL
Status: DISCONTINUED | OUTPATIENT
Start: 2025-05-23 | End: 2025-05-23 | Stop reason: SDUPTHER

## 2025-05-23 RX ORDER — ONDANSETRON 2 MG/ML
4 INJECTION INTRAMUSCULAR; INTRAVENOUS
Status: CANCELLED | OUTPATIENT
Start: 2025-05-23

## 2025-05-23 RX ORDER — SODIUM CHLORIDE 9 MG/ML
25 INJECTION, SOLUTION INTRAVENOUS PRN
Status: DISCONTINUED | OUTPATIENT
Start: 2025-05-23 | End: 2025-05-23 | Stop reason: HOSPADM

## 2025-05-23 RX ORDER — SODIUM CHLORIDE 0.9 % (FLUSH) 0.9 %
5-40 SYRINGE (ML) INJECTION PRN
Status: CANCELLED | OUTPATIENT
Start: 2025-05-23

## 2025-05-23 RX ORDER — SODIUM CHLORIDE 0.9 % (FLUSH) 0.9 %
5-40 SYRINGE (ML) INJECTION EVERY 12 HOURS SCHEDULED
Status: CANCELLED | OUTPATIENT
Start: 2025-05-23

## 2025-05-23 RX ORDER — NALOXONE HYDROCHLORIDE 0.4 MG/ML
INJECTION, SOLUTION INTRAMUSCULAR; INTRAVENOUS; SUBCUTANEOUS PRN
Status: CANCELLED | OUTPATIENT
Start: 2025-05-23

## 2025-05-23 RX ADMIN — SODIUM CHLORIDE 1000 ML: 0.9 INJECTION, SOLUTION INTRAVENOUS at 06:54

## 2025-05-23 RX ADMIN — PROPOFOL 50 MG: 10 INJECTION, EMULSION INTRAVENOUS at 07:54

## 2025-05-23 RX ADMIN — LIDOCAINE HYDROCHLORIDE 100 MG: 20 INJECTION, SOLUTION EPIDURAL; INFILTRATION; INTRACAUDAL; PERINEURAL at 07:49

## 2025-05-23 RX ADMIN — Medication 0.2 MG: at 07:49

## 2025-05-23 RX ADMIN — Medication 50 MG: at 07:49

## 2025-05-23 RX ADMIN — PROPOFOL 50 MG: 10 INJECTION, EMULSION INTRAVENOUS at 07:49

## 2025-05-23 RX ADMIN — PROPOFOL 50 MG: 10 INJECTION, EMULSION INTRAVENOUS at 07:58

## 2025-05-23 ASSESSMENT — PAIN SCALES - GENERAL: PAINLEVEL_OUTOF10: 0

## 2025-05-23 NOTE — ANESTHESIA POSTPROCEDURE EVALUATION
Department of Anesthesiology  Postprocedure Note    Patient: Kasia Watson  MRN: [id number]  YOB: 1995  Date of evaluation: 5/23/2025    Procedure Summary       Date: 05/23/25 Room / Location: [place] [id number] / [facility]    Anesthesia Start: 0746 Anesthesia Stop: 0802    Procedure: ESOPHAGOGASTRODUODENOSCOPY BIOPSY (Abdomen) Diagnosis:       Chronic GERD      (Chronic GERD [K21.9])    Surgeons: Jie Mosley MD Responsible Provider: Kalia Bentley MD    Anesthesia Type: MAC ASA Status: 3            Anesthesia Type: No value filed.    Matt Phase I: Matt Score: 10    Matt Phase II: Matt Score: 8    Anesthesia Post Evaluation    Patient location during evaluation: bedside  Patient participation: complete - patient participated  Level of consciousness: awake  Pain score: 0  Airway patency: patent  Nausea & Vomiting: no nausea  Cardiovascular status: blood pressure returned to baseline  Respiratory status: acceptable  Hydration status: euvolemic  Pain management: adequate    No notable events documented.

## 2025-05-23 NOTE — ANESTHESIA PRE PROCEDURE
Department of Anesthesiology  Preprocedure Note       Name:  Kasia Watson   Age:  29 y.o.  :  1995                                          MRN:  [id number]         Date:  2025      Surgeon: Surgeon(s):  Jie Mosley MD    Procedure: Procedure(s):  ESOPHAGOGASTRODUODENOSCOPY    Medications prior to admission:   Prior to Admission medications    Medication Sig Start Date End Date Taking? Authorizing Provider   Paragard Intrauterine Copper IUD 1 each by IntraUTERine route once   Yes Marisela Ceballos MD   amLODIPine (NORVASC) 5 MG tablet Take 1 tablet by mouth daily   Yes Marisela Ceballos MD   IBUPROFEN PO Take by mouth as needed   Yes Marisela Ceballos MD   QUEtiapine (SEROQUEL) 25 MG tablet Take 1 tablet by mouth 2 times daily   Yes Marisela Ceballos MD   ALBUTEROL IN Inhale into the lungs  Patient not taking: Reported on 2025    Marisela Ceballos MD   ondansetron (ZOFRAN) 4 MG tablet Take 1 tablet by mouth every 8 hours as needed for Nausea  Patient not taking: Reported on 2025 3/18/23   Carin Ceils PA-C   valACYclovir (VALTREX) 500 MG tablet Take 1 tablet by mouth as needed  Patient not taking: Reported on 2025    Marisela Ceballos MD   albuterol (ACCUNEB) 0.63 MG/3ML nebulizer solution Take 3 mLs by nebulization every 6 hours as needed for Wheezing  Patient not taking: Reported on 2025    Marisela Ceballos MD       Current medications:    Current Facility-Administered Medications   Medication Dose Route Frequency Provider Last Rate Last Admin    sodium chloride flush 0.9 % injection 5-40 mL  5-40 mL IntraVENous 2 times per day Jie Mosley MD        sodium chloride flush 0.9 % injection 5-40 mL  5-40 mL IntraVENous PRN Jie Mosley MD        0.9 % sodium chloride infusion  25 mL IntraVENous PRN Jie Mosley MD           Allergies:    Allergies   Allergen Reactions    Shellfish-Derived Products Swelling       Problem List:

## 2025-05-23 NOTE — PROGRESS NOTES
Pt admitted to PACU via stretcher from endoscopy. Report received, assessment complete. VSS. Pt very sleepy at this time.
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
## 2025-06-03 ENCOUNTER — HOSPITAL ENCOUNTER (OUTPATIENT)
Age: 30
Discharge: HOME | End: 2025-06-03
Payer: COMMERCIAL

## 2025-06-03 ENCOUNTER — INITIAL CONSULT (OUTPATIENT)
Dept: BARIATRICS/WEIGHT MGMT | Age: 30
End: 2025-06-03
Payer: COMMERCIAL

## 2025-06-03 DIAGNOSIS — E66.01 SEVERE OBESITY (BMI 35.0-39.9) WITH COMORBIDITY (HCC): ICD-10-CM

## 2025-06-03 DIAGNOSIS — K43.9 VENTRAL HERNIA WITHOUT OBSTRUCTION OR GANGRENE: ICD-10-CM

## 2025-06-03 DIAGNOSIS — I10 ESSENTIAL HYPERTENSION: ICD-10-CM

## 2025-06-03 DIAGNOSIS — K21.9 CHRONIC GERD: ICD-10-CM

## 2025-06-03 DIAGNOSIS — E66.01 MORBID OBESITY WITH BMI OF 40.0-44.9, ADULT (HCC): ICD-10-CM

## 2025-06-03 DIAGNOSIS — F33.0 MAJOR DEPRESSIVE DISORDER, RECURRENT EPISODE, MILD WITH ANXIOUS DISTRESS: Primary | ICD-10-CM

## 2025-06-03 DIAGNOSIS — E78.1 HYPERTRIGLYCERIDEMIA: ICD-10-CM

## 2025-06-03 LAB
AMPHETAMINES UR QL SCN>1000 NG/ML: NORMAL
BARBITURATES UR QL SCN>200 NG/ML: NORMAL
BENZODIAZ UR QL SCN>200 NG/ML: NORMAL
CANNABINOIDS UR QL SCN>50 NG/ML: NORMAL
COCAINE UR QL SCN: NORMAL
DRUG SCREEN COMMENT UR-IMP: NORMAL
ETHANOLAMINE SERPL-MCNC: NORMAL MG/DL (ref 0–0.08)
FENTANYL SCREEN, URINE: NORMAL
HCG UR QL: NEGATIVE
METHADONE UR QL SCN>300 NG/ML: NORMAL
OPIATES UR QL SCN>300 NG/ML: NORMAL
OXYCODONE UR QL SCN: NORMAL
PCP UR QL SCN>25 NG/ML: NORMAL
PH UR STRIP: 6 [PH]

## 2025-06-03 PROCEDURE — 90791 PSYCH DIAGNOSTIC EVALUATION: CPT | Performed by: PSYCHOLOGIST

## 2025-06-03 PROCEDURE — 96136 PSYCL/NRPSYC TST PHY/QHP 1ST: CPT | Performed by: PSYCHOLOGIST

## 2025-06-03 PROCEDURE — 84703 CHORIONIC GONADOTROPIN ASSAY: CPT

## 2025-06-03 PROCEDURE — 82077 ASSAY SPEC XCP UR&BREATH IA: CPT

## 2025-06-03 PROCEDURE — 80307 DRUG TEST PRSMV CHEM ANLYZR: CPT

## 2025-06-03 PROCEDURE — 1036F TOBACCO NON-USER: CPT | Performed by: PSYCHOLOGIST

## 2025-06-03 PROCEDURE — 96130 PSYCL TST EVAL PHYS/QHP 1ST: CPT | Performed by: PSYCHOLOGIST

## 2025-06-03 PROCEDURE — 36415 COLL VENOUS BLD VENIPUNCTURE: CPT

## 2025-06-03 PROCEDURE — G0480 DRUG TEST DEF 1-7 CLASSES: HCPCS
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
## 2025-06-05 LAB
A-TOCOPHEROL VIT E SERPL-MCNC: 17.7 MG/L (ref 5.5–18)
ANNOTATION COMMENT IMP: NORMAL
BETA+GAMMA TOCOPHEROL SERPL-MCNC: 1.2 MG/L (ref 0–6)
RETINYL PALMITATE SERPL-MCNC: 0.08 MG/L (ref 0–0.1)
VIT A SERPL-MCNC: 0.79 MG/L (ref 0.3–1.2)

## 2025-06-06 LAB
COTININE SERPL-MCNC: <5 NG/ML
NICOTINE SERPL-MCNC: <5 NG/ML

## 2025-06-10 ENCOUNTER — OFFICE VISIT (OUTPATIENT)
Dept: BARIATRICS/WEIGHT MGMT | Age: 30
End: 2025-06-10
Payer: COMMERCIAL

## 2025-06-10 VITALS
WEIGHT: 240 LBS | BODY MASS INDEX: 39.99 KG/M2 | HEART RATE: 88 BPM | OXYGEN SATURATION: 99 % | DIASTOLIC BLOOD PRESSURE: 86 MMHG | RESPIRATION RATE: 18 BRPM | SYSTOLIC BLOOD PRESSURE: 124 MMHG | HEIGHT: 65 IN

## 2025-06-10 DIAGNOSIS — E66.01 MORBID OBESITY WITH BMI OF 40.0-44.9, ADULT (HCC): Primary | ICD-10-CM

## 2025-06-10 DIAGNOSIS — K43.9 VENTRAL HERNIA WITHOUT OBSTRUCTION OR GANGRENE: ICD-10-CM

## 2025-06-10 DIAGNOSIS — I10 ESSENTIAL HYPERTENSION: ICD-10-CM

## 2025-06-10 DIAGNOSIS — K21.9 CHRONIC GERD: ICD-10-CM

## 2025-06-10 DIAGNOSIS — E78.1 HYPERTRIGLYCERIDEMIA: ICD-10-CM

## 2025-06-10 PROCEDURE — 99214 OFFICE O/P EST MOD 30 MIN: CPT | Performed by: SURGERY

## 2025-06-10 PROCEDURE — G8417 CALC BMI ABV UP PARAM F/U: HCPCS | Performed by: SURGERY

## 2025-06-10 PROCEDURE — G8427 DOCREV CUR MEDS BY ELIG CLIN: HCPCS | Performed by: SURGERY

## 2025-06-10 PROCEDURE — 3074F SYST BP LT 130 MM HG: CPT | Performed by: SURGERY

## 2025-06-10 PROCEDURE — G2211 COMPLEX E/M VISIT ADD ON: HCPCS | Performed by: SURGERY

## 2025-06-10 PROCEDURE — 3079F DIAST BP 80-89 MM HG: CPT | Performed by: SURGERY

## 2025-06-10 PROCEDURE — 1036F TOBACCO NON-USER: CPT | Performed by: SURGERY

## 2025-06-10 NOTE — PROGRESS NOTES
Kasia Watson gained 2.0 lbs over 1 month. Discussed help with pt: dietary changes to be successful with surgery and needing to stop eating out.     Breakfast: skips (because of waking up late) OR 2 scrambled eggs/fried eggs + 2 slices piedra + toast (grape jelly)   Snack: none OR oreo cookies OR fruit snacks OR fruit   Lunch: smoothie (smoothie shayna or tropical smoothie - strawberry banana + fat burner + energy booster) OR protein shake (muscle milk) OR panera (1/2 sandwich (turkey or grilled chicken) with soup (tortilla or chicken noodle))  Snack: none OR oreo cookies OR fruit snacks OR fruit   Dinner: cabbage with ground beef and tomato sauce   Snack: none OR fruit (middle of night)     Is pt consuming smaller portions? no    Is pt consuming at least 64 oz of fluids per day? 3 bottles per day,     Is pt consuming carbonated, caffeinated, or sugary beverages?  Regular gatorade, 1 cup coffee daily (flavored creamer, equal), apple juice/hawaiian punch    Has pt sampled Unjury and/or Nectar protein? Will buy today     Has patient attended a support group? Scheduled 7/10    Exercise: 2x per week to the gym, or goes to park to walk     Plan/Recommendations:   - avoid eating out and pack lunches/snacks  - eliminate calorie filled beverages  - buy and try protein powders  - attend SG    Handouts: none    Magdalene Becker, RD, LD  
risk deterioration.       Obesity: Body mass index is 39.94 kg/m².  [x] Continue to make dietary and lifestyle modifications.  [x] Plan for Future laparoscopic sleeve gastrectomy.  [x] Return for follow-up next month.      Chronic GERD:   [x] Continue to make dietary and lifestyle modifications.  [x] Continue Omeprazole.  [] Continue Famotidine.  [] Plan for EGD to evaluate the stomach.    Essential Hypertension:  [x] Continue to make dietary and lifestyle modifications.  [x] Reviewed the importance of checking blood pressure.  [x] Continue to follow up with their PCP for medication management and monitoring.      Hyperlipidemia:   [x] Continue to make dietary and lifestyle modifications.  [x] Continue to follow up with their PCP for medication management and monitoring.             Patient advised that its their responsibility to follow up for studies, referrals and/or labs ordered today.

## 2025-07-22 ENCOUNTER — OFFICE VISIT (OUTPATIENT)
Dept: BARIATRICS/WEIGHT MGMT | Age: 30
End: 2025-07-22
Payer: COMMERCIAL

## 2025-07-22 VITALS
OXYGEN SATURATION: 98 % | WEIGHT: 241 LBS | DIASTOLIC BLOOD PRESSURE: 89 MMHG | HEART RATE: 85 BPM | HEIGHT: 65 IN | SYSTOLIC BLOOD PRESSURE: 134 MMHG | RESPIRATION RATE: 18 BRPM | BODY MASS INDEX: 40.15 KG/M2

## 2025-07-22 DIAGNOSIS — K21.9 CHRONIC GERD: ICD-10-CM

## 2025-07-22 DIAGNOSIS — K43.9 VENTRAL HERNIA WITHOUT OBSTRUCTION OR GANGRENE: ICD-10-CM

## 2025-07-22 DIAGNOSIS — I10 ESSENTIAL HYPERTENSION: ICD-10-CM

## 2025-07-22 DIAGNOSIS — E78.1 HYPERTRIGLYCERIDEMIA: ICD-10-CM

## 2025-07-22 DIAGNOSIS — E66.01 MORBID OBESITY WITH BMI OF 40.0-44.9, ADULT (HCC): Primary | ICD-10-CM

## 2025-07-22 PROCEDURE — G8417 CALC BMI ABV UP PARAM F/U: HCPCS | Performed by: SURGERY

## 2025-07-22 PROCEDURE — 1036F TOBACCO NON-USER: CPT | Performed by: SURGERY

## 2025-07-22 PROCEDURE — G2211 COMPLEX E/M VISIT ADD ON: HCPCS | Performed by: SURGERY

## 2025-07-22 PROCEDURE — G8427 DOCREV CUR MEDS BY ELIG CLIN: HCPCS | Performed by: SURGERY

## 2025-07-22 PROCEDURE — 99214 OFFICE O/P EST MOD 30 MIN: CPT | Performed by: SURGERY

## 2025-07-22 PROCEDURE — 3075F SYST BP GE 130 - 139MM HG: CPT | Performed by: SURGERY

## 2025-07-22 PROCEDURE — 3079F DIAST BP 80-89 MM HG: CPT | Performed by: SURGERY

## 2025-07-22 NOTE — PATIENT INSTRUCTIONS
Patient received dietary handouts and education.    Plan/Recommendations:   - Eat 4X/day spaced 3-4 hours apart   - Plan and pack snacks from MP vs buying snack at work  - Eliminate all soda and juice  - Continue trying protein powders

## 2025-07-22 NOTE — PROGRESS NOTES
Kasia Watson gained 1 lbs over 6 weeks. Pt is allergic to shellfish.    Wake up 7:30 AM. Works W-Sat 9:45 AM- 8 PM  Breakfast: 9:30 AM: 3 Scrambled eggs w/ spinach + 2 slice piedra OR late 11:30 AM: scrambled eggs + strawberry crepes OR skips  Snack: None  Lunch: 11:30 AM: Chipotle burrito w/ rice/corn/SC/cheese/1/2 chix and 1/2 steak + soda  Snack: 1, 3, and 5 PM: Donut OR oatmeal OR 1-2 mini bfast sandwich - chooses from cafe at work  Dinner: 9-10 PM: Pork chop + green beans + corn OR ramen noodles OR skipped - had meal at work  Snack: None    Is pt consuming smaller portions? no    Is pt consuming at least 64 oz of fluids per day? 3 bottles water    Is pt consuming carbonated, caffeinated, or sugary beverages? Regular gatorade, 2 cup coffee daily (flavored creamer, equal), apple juice/hawaiian punch     Has pt sampled Unjury and/or Nectar protein? Yes, needs to try more    Has patient attended a support group? Completed 7/10 zoom    Exercise: Generally busy/ walking/ at park     Plan/Recommendations:   - Eat 4X/day spaced 3-4 hours apart   - Plan and pack snacks from MP vs buying snack at work  - Eliminate all soda and juice  - Continue trying protein powders    Handouts: 1200 kcal PSMP, [employer]it[employer], Frozen Meals     Stephany Marin RD    
encounter today, included any number of the following: Bariatric Pre/Post operative work up/protocols, review of labs, imaging, provider notes, outside hospital records, performing examination/evaluation, counseling patient and/or family, ordering medications/tests, placing referrals and communication with referring physicians, coordination of care; discussing dietary plan/recall with the patient as well with registered dietitian and documentation in the EHR. Of note, the above was done during same day of the actual patient encounter.     I did explain thoroughly to the patient that compliance with pre- and post op diet and other recommendations are integral part to improve the chances of successful weight loss and also not following it could end with serious health complications.   Some strategies discussed today include but not limited to : 30/30/30 minutes rule, food diary, avoid fast food and packing/planing ahead, & increasing exercise. Also stressed to the patient importance of taking the multivitamins as instructed, otherwise risk significant complications.    Obesity as a disease is considered a high risk to patients overall health and should therefore be considered a high risk disease state.  This is a complex visit given the patient is presenting with multiple chronic comorbid conditions.       Kasia Watson is a 30 y.o. female with Body mass index is 40.1 kg/m².,  patient is deemed appropriate candidate for weight loss surgery by our multidisciplinary team.       Following The Metabolic and Bariatric Surgery Accreditation and Quality Improvement Program (MBSAQIP), and American College of Surgeons (ACS) recommendations, the Bariatric Surgical Risk/Benefit Calculator was used for Kasia Watson.  Report was discussed with Kasia and patient wishes to proceed with surgery, fully understanding the risks and benefits.    Of note, The MBSAQIP Bariatric Surgical Risk/Benefit Calculator estimates the chance of an

## (undated) DEVICE — SINGLE USE AIR/WATER, SUCTION AND BIOPSY VALVES SET: Brand: ORCAPOD™

## (undated) DEVICE — MOUTHPIECE ENDOSCP L CTRL OPN AND SIDE PORTS DISP

## (undated) DEVICE — AIR/WATER CLEANING ADAPTER FOR OLYMPUS® GI ENDOSCOPE: Brand: BULLDOG®

## (undated) DEVICE — FORCEPS BX L240CM WRK CHN 2.8MM STD CAP W/ NDL MIC MESH

## (undated) DEVICE — SOLUTION IRRIG 500ML STRL H2O NONPYROGENIC

## (undated) DEVICE — ENDOSCOPIC KIT 6X3/16 FT COLON W/ 1.1 OZ 2 GWN W/O BRSH

## (undated) DEVICE — BW-412T DISP COMBO CLEANING BRUSH: Brand: SINGLE USE COMBINATION CLEANING BRUSH

## (undated) DEVICE — CAP WATER BTL AIR TBNG L 16 IN CO2 TBNG L 48 IN ENDOSCP

## (undated) DEVICE — TUBING IRRIG COMPATIBLE W ERBE MEDIVATOR PMP HYDR